# Patient Record
Sex: MALE | Race: WHITE | NOT HISPANIC OR LATINO | Employment: OTHER | URBAN - METROPOLITAN AREA
[De-identification: names, ages, dates, MRNs, and addresses within clinical notes are randomized per-mention and may not be internally consistent; named-entity substitution may affect disease eponyms.]

---

## 2017-01-05 DIAGNOSIS — R55 SYNCOPE AND COLLAPSE: ICD-10-CM
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
## 2017-01-11 ENCOUNTER — TELEPHONE (OUTPATIENT)
Dept: CARDIOLOGY | Facility: MEDICAL CENTER | Age: 82
End: 2017-01-11

## 2017-01-11 LAB — EKG IMPRESSION: NORMAL

## 2017-01-11 NOTE — TELEPHONE ENCOUNTER
Holter monitor from 12/29/2016 read by Dr. Mauro Garza:    Summary: short runs of SVT, likely atrial tachycardia, not dangerous     Electronically Signed On 1- 9:12:08 PST by Mauro Garza MD     Called patient and advised him of test results. Patient is thankful for the call.    RODRIGUEZ NAVARRO

## 2017-01-27 ENCOUNTER — APPOINTMENT (OUTPATIENT)
Dept: RADIOLOGY | Facility: MEDICAL CENTER | Age: 82
End: 2017-01-27
Attending: EMERGENCY MEDICINE
Payer: MEDICARE

## 2017-01-27 ENCOUNTER — HOSPITAL ENCOUNTER (EMERGENCY)
Facility: MEDICAL CENTER | Age: 82
End: 2017-01-27
Attending: EMERGENCY MEDICINE
Payer: MEDICARE

## 2017-01-27 VITALS
HEIGHT: 68 IN | BODY MASS INDEX: 21.05 KG/M2 | OXYGEN SATURATION: 95 % | WEIGHT: 138.89 LBS | TEMPERATURE: 98 F | SYSTOLIC BLOOD PRESSURE: 192 MMHG | DIASTOLIC BLOOD PRESSURE: 89 MMHG | RESPIRATION RATE: 14 BRPM | HEART RATE: 45 BPM

## 2017-01-27 DIAGNOSIS — I10 ESSENTIAL HYPERTENSION: ICD-10-CM

## 2017-01-27 LAB
ALBUMIN SERPL BCP-MCNC: 4.1 G/DL (ref 3.2–4.9)
ALBUMIN/GLOB SERPL: 1.6 G/DL
ALP SERPL-CCNC: 87 U/L (ref 30–99)
ALT SERPL-CCNC: 52 U/L (ref 2–50)
ANION GAP SERPL CALC-SCNC: 8 MMOL/L (ref 0–11.9)
APTT PPP: 32.7 SEC (ref 24.7–36)
AST SERPL-CCNC: 44 U/L (ref 12–45)
BASOPHILS # BLD AUTO: 0.2 % (ref 0–1.8)
BASOPHILS # BLD: 0.01 K/UL (ref 0–0.12)
BILIRUB SERPL-MCNC: 1.4 MG/DL (ref 0.1–1.5)
BNP SERPL-MCNC: 482 PG/ML (ref 0–100)
BUN SERPL-MCNC: 21 MG/DL (ref 8–22)
CALCIUM SERPL-MCNC: 8.7 MG/DL (ref 8.4–10.2)
CHLORIDE SERPL-SCNC: 104 MMOL/L (ref 96–112)
CO2 SERPL-SCNC: 30 MMOL/L (ref 20–33)
CREAT SERPL-MCNC: 1.06 MG/DL (ref 0.5–1.4)
EKG IMPRESSION: NORMAL
EOSINOPHIL # BLD AUTO: 0.05 K/UL (ref 0–0.51)
EOSINOPHIL NFR BLD: 1.1 % (ref 0–6.9)
ERYTHROCYTE [DISTWIDTH] IN BLOOD BY AUTOMATED COUNT: 48.3 FL (ref 35.9–50)
GFR SERPL CREATININE-BSD FRML MDRD: >60 ML/MIN/1.73 M 2
GLOBULIN SER CALC-MCNC: 2.5 G/DL (ref 1.9–3.5)
GLUCOSE SERPL-MCNC: 90 MG/DL (ref 65–99)
HCT VFR BLD AUTO: 37.2 % (ref 42–52)
HGB BLD-MCNC: 12.6 G/DL (ref 14–18)
IMM GRANULOCYTES # BLD AUTO: 0.01 K/UL (ref 0–0.11)
IMM GRANULOCYTES NFR BLD AUTO: 0.2 % (ref 0–0.9)
INR PPP: 1.06 (ref 0.87–1.13)
LIPASE SERPL-CCNC: 28 U/L (ref 7–58)
LYMPHOCYTES # BLD AUTO: 1.67 K/UL (ref 1–4.8)
LYMPHOCYTES NFR BLD: 36.6 % (ref 22–41)
MCH RBC QN AUTO: 31.2 PG (ref 27–33)
MCHC RBC AUTO-ENTMCNC: 33.9 G/DL (ref 33.7–35.3)
MCV RBC AUTO: 92.1 FL (ref 81.4–97.8)
MONOCYTES # BLD AUTO: 0.48 K/UL (ref 0–0.85)
MONOCYTES NFR BLD AUTO: 10.5 % (ref 0–13.4)
NEUTROPHILS # BLD AUTO: 2.34 K/UL (ref 1.82–7.42)
NEUTROPHILS NFR BLD: 51.4 % (ref 44–72)
NRBC # BLD AUTO: 0 K/UL
NRBC BLD AUTO-RTO: 0 /100 WBC
PLATELET # BLD AUTO: 174 K/UL (ref 164–446)
PMV BLD AUTO: 9.8 FL (ref 9–12.9)
POTASSIUM SERPL-SCNC: 3.4 MMOL/L (ref 3.6–5.5)
PROT SERPL-MCNC: 6.6 G/DL (ref 6–8.2)
PROTHROMBIN TIME: 13.7 SEC (ref 12–14.6)
RBC # BLD AUTO: 4.04 M/UL (ref 4.7–6.1)
SODIUM SERPL-SCNC: 142 MMOL/L (ref 135–145)
TROPONIN I SERPL-MCNC: <0.02 NG/ML (ref 0–0.04)
WBC # BLD AUTO: 4.6 K/UL (ref 4.8–10.8)

## 2017-01-27 PROCEDURE — 80053 COMPREHEN METABOLIC PANEL: CPT

## 2017-01-27 PROCEDURE — 83690 ASSAY OF LIPASE: CPT

## 2017-01-27 PROCEDURE — 71010 DX-CHEST-PORTABLE (1 VIEW): CPT

## 2017-01-27 PROCEDURE — 85025 COMPLETE CBC W/AUTO DIFF WBC: CPT

## 2017-01-27 PROCEDURE — 85610 PROTHROMBIN TIME: CPT

## 2017-01-27 PROCEDURE — 99284 EMERGENCY DEPT VISIT MOD MDM: CPT

## 2017-01-27 PROCEDURE — 84484 ASSAY OF TROPONIN QUANT: CPT

## 2017-01-27 PROCEDURE — 83880 ASSAY OF NATRIURETIC PEPTIDE: CPT

## 2017-01-27 PROCEDURE — 93005 ELECTROCARDIOGRAM TRACING: CPT | Performed by: EMERGENCY MEDICINE

## 2017-01-27 PROCEDURE — 36415 COLL VENOUS BLD VENIPUNCTURE: CPT

## 2017-01-27 PROCEDURE — 85730 THROMBOPLASTIN TIME PARTIAL: CPT

## 2017-01-27 PROCEDURE — 700102 HCHG RX REV CODE 250 W/ 637 OVERRIDE(OP): Performed by: EMERGENCY MEDICINE

## 2017-01-27 PROCEDURE — A9270 NON-COVERED ITEM OR SERVICE: HCPCS | Performed by: EMERGENCY MEDICINE

## 2017-01-27 RX ORDER — ENALAPRIL MALEATE 5 MG/1
5 TABLET ORAL ONCE
Status: COMPLETED | OUTPATIENT
Start: 2017-01-27 | End: 2017-01-27

## 2017-01-27 RX ORDER — ENALAPRIL MALEATE 5 MG/1
5 TABLET ORAL DAILY
Qty: 30 TAB | Refills: 0 | Status: SHIPPED | OUTPATIENT
Start: 2017-01-27 | End: 2018-03-17

## 2017-01-27 RX ORDER — CLONIDINE HYDROCHLORIDE 0.1 MG/1
0.1 TABLET ORAL ONCE
Status: COMPLETED | OUTPATIENT
Start: 2017-01-27 | End: 2017-01-27

## 2017-01-27 RX ORDER — HYDRALAZINE HYDROCHLORIDE 20 MG/ML
20 INJECTION INTRAMUSCULAR; INTRAVENOUS ONCE
Status: DISCONTINUED | OUTPATIENT
Start: 2017-01-27 | End: 2017-01-27 | Stop reason: HOSPADM

## 2017-01-27 RX ORDER — DONEPEZIL HYDROCHLORIDE 5 MG/1
5 TABLET, FILM COATED ORAL NIGHTLY
Status: SHIPPED | COMMUNITY
End: 2018-03-17

## 2017-01-27 RX ADMIN — CLONIDINE HYDROCHLORIDE 0.1 MG: 0.1 TABLET ORAL at 16:21

## 2017-01-27 RX ADMIN — ENALAPRIL MALEATE 5 MG: 5 TABLET ORAL at 15:13

## 2017-01-27 ASSESSMENT — PAIN SCALES - GENERAL: PAINLEVEL_OUTOF10: 0

## 2017-01-27 NOTE — ED NOTES
Pt medicated as ordered with Vasotec for HTN. POC was discussed with pt and pt's daughter using AIDET. They verbalized understanding. Pt with call light in reach and tamara in low position for pt safety. PCXR completed.

## 2017-01-27 NOTE — ED AVS SNAPSHOT
After Visit Summary                                                                                                                Frederick Louis Schwab   MRN: [id number]    Department:  [facility], Emergency Dept   Date of Visit:  1/27/2017            [facility], Emergency Dept    [address]    [place] [address]    Phone:  [phone]      You were seen by     Phu Constantino M.D.      Your Diagnosis Was     Essential hypertension     I10       These are the medications you received during your hospitalization from 01/27/2017 1226 to 01/27/2017 1801     Date/Time Order Dose Route Action    01/27/2017 1513 enalapril (VASOTEC) tablet 5 mg 5 mg Oral Given    01/27/2017 1621 clonidine (CATAPRES) tablet 0.1 mg 0.1 mg Oral Given      Follow-up Information     1. Follow up with Dave Mack D.O. In 1 week.    Specialty:  Family Medicine    Contact information    [facility]  [address]  [place] [address]  [phone]        Medication Information     Review all of your home medications and newly ordered medications with your primary doctor and/or pharmacist as soon as possible. Follow medication instructions as directed by your doctor and/or pharmacist.     Please keep your complete medication list with you and share with your physician. Update the information when medications are discontinued, doses are changed, or new medications (including over-the-counter products) are added; and carry medication information at all times in the event of emergency situations.               Medication List      START taking these medications        Instructions    enalapril 5 MG Tabs   Commonly known as:  VASOTEC    Take 1 Tab by mouth every day.   Dose:  5 mg         ASK your doctor about these medications        Instructions    CALCIUM PO    Take 1 Tab by mouth every morning.   Dose:  1 Tab       donepezil 5 MG Tabs   Commonly known as:  ARICEPT    Take 5 mg by mouth every  evening.   Dose:  5 mg       IRON PO    Take 1 Tab by mouth every day.   Dose:  1 Tab       therapeutic multivitamin-minerals Tabs    Take 1 Tab by mouth every morning.   Dose:  1 Tab               Procedures and tests performed during your visit     APTT    BTYPE NATRIURETIC PEPTIDE    CBC WITH DIFFERENTIAL    COMP METABOLIC PANEL    DX-CHEST-PORTABLE (1 VIEW)    EKG (ER)    ESTIMATED GFR    LIPASE    PROTHROMBIN TIME    TROPONIN        Discharge Instructions       Arterial Hypertension  Arterial hypertension (high blood pressure) is a condition of elevated pressure in your blood vessels. Hypertension over a long period of time is a risk factor for strokes, heart attacks, and heart failure. It is also the leading cause of kidney (renal) failure.   CAUSES   · In Adults -- Over 90% of all hypertension has no known cause. This is called essential or primary hypertension. In the other 10% of people with hypertension, the increase in blood pressure is caused by another disorder. This is called secondary hypertension. Important causes of secondary hypertension are:  · Heavy alcohol use.  · Obstructive sleep apnea.  · Hyperaldosterosim (Conn's syndrome).  · Steroid use.  · Chronic kidney failure.  · Hyperparathyroidism.  · Medications.  · Renal artery stenosis.  · Pheochromocytoma.  · Cushing's disease.  · Coarctation of the aorta.  · Scleroderma renal crisis.  · Licorice (in excessive amounts).  · Drugs (cocaine, methamphetamine).  Your caregiver can explain any items above that apply to you.  · In Children -- Secondary hypertension is more common and should always be considered.  · Pregnancy -- Few women of childbearing age have high blood pressure. However, up to 10% of them develop hypertension of pregnancy. Generally, this will not harm the woman. It may be a sign of 3 complications of pregnancy: preeclampsia, HELLP syndrome, and eclampsia. Follow up and control with medication is necessary.  SYMPTOMS   · This  "condition normally does not produce any noticeable symptoms. It is usually found during a routine exam.  · Malignant hypertension is a late problem of high blood pressure. It may have the following symptoms:  · Headaches.  · Blurred vision.  · End-organ damage (this means your kidneys, heart, lungs, and other organs are being damaged).  · Stressful situations can increase the blood pressure. If a person with normal blood pressure has their blood pressure go up while being seen by their caregiver, this is often termed \"white coat hypertension.\" Its importance is not known. It may be related with eventually developing hypertension or complications of hypertension.  · Hypertension is often confused with mental tension, stress, and anxiety.  DIAGNOSIS   The diagnosis is made by 3 separate blood pressure measurements. They are taken at least 1 week apart from each other. If there is organ damage from hypertension, the diagnosis may be made without repeat measurements.  Hypertension is usually identified by having blood pressure readings:  · Above 140/90 mmHg measured in both arms, at 3 separate times, over a couple weeks.  · Over 130/80 mmHg should be considered a risk factor and may require treatment in patients with diabetes.  Blood pressure readings over 120/80 mmHg are called \"pre-hypertension\" even in non-diabetic patients.  To get a true blood pressure measurement, use the following guidelines. Be aware of the factors that can alter blood pressure readings.  · Take measurements at least 1 hour after caffeine.  · Take measurements 30 minutes after smoking and without any stress. This is another reason to quit smoking  it raises your blood pressure.  · Use a proper cuff size. Ask your caregiver if you are not sure about your cuff size.  · Most home blood pressure cuffs are automatic. They will measure systolic and diastolic pressures. The systolic pressure is the pressure reading at the start of sounds. Diastolic " "pressure is the pressure at which the sounds disappear. If you are elderly, measure pressures in multiple postures. Try sitting, lying or standing.  · Sit at rest for a minimum of 5 minutes before taking measurements.  · You should not be on any medications like decongestants. These are found in many cold medications.  · Record your blood pressure readings and review them with your caregiver.  If you have hypertension:  · Your caregiver may do tests to be sure you do not have secondary hypertension (see \"causes\" above).  · Your caregiver may also look for signs of metabolic syndrome. This is also called Syndrome X or Insulin Resistance Syndrome. You may have this syndrome if you have type 2 diabetes, abdominal obesity, and abnormal blood lipids in addition to hypertension.  · Your caregiver will take your medical and family history and perform a physical exam.  · Diagnostic tests may include blood tests (for glucose, cholesterol, potassium, and kidney function), a urinalysis, or an EKG. Other tests may also be necessary depending on your condition.  PREVENTION   There are important lifestyle issues that you can adopt to reduce your chance of developing hypertension:  · Maintain a normal weight.  · Limit the amount of salt (sodium) in your diet.  · Exercise often.  · Limit alcohol intake.  · Get enough potassium in your diet. Discuss specific advice with your caregiver.  · Follow a DASH diet (dietary approaches to stop hypertension). This diet is rich in fruits, vegetables, and low-fat dairy products, and avoids certain fats.  PROGNOSIS   Essential hypertension cannot be cured. Lifestyle changes and medical treatment can lower blood pressure and reduce complications. The prognosis of secondary hypertension depends on the underlying cause. Many people whose hypertension is controlled with medicine or lifestyle changes can live a normal, healthy life.   RISKS AND COMPLICATIONS   While high blood pressure alone is not " "an illness, it often requires treatment due to its short- and long-term effects on many organs. Hypertension increases your risk for:  · CVAs or strokes (cerebrovascular accident).  · Heart failure due to chronically high blood pressure (hypertensive cardiomyopathy).  · Heart attack (myocardial infarction).  · Damage to the retina (hypertensive retinopathy).  · Kidney failure (hypertensive nephropathy).  Your caregiver can explain list items above that apply to you. Treatment of hypertension can significantly reduce the risk of complications.  TREATMENT   · For overweight patients, weight loss and regular exercise are recommended. Physical fitness lowers blood pressure.  · Mild hypertension is usually treated with diet and exercise. A diet rich in fruits and vegetables, fat-free dairy products, and foods low in fat and salt (sodium) can help lower blood pressure. Decreasing salt intake decreases blood pressure in a 1/3 of people.  · Stop smoking if you are a smoker.  The steps above are highly effective in reducing blood pressure. While these actions are easy to suggest, they are difficult to achieve. Most patients with moderate or severe hypertension end up requiring medications to bring their blood pressure down to a normal level. There are several classes of medications for treatment. Blood pressure pills (antihypertensives) will lower blood pressure by their different actions. Lowering the blood pressure by 10 mmHg may decrease the risk of complications by as much as 25%.  The goal of treatment is effective blood pressure control. This will reduce your risk for complications. Your caregiver will help you determine the best treatment for you according to your lifestyle. What is excellent treatment for one person, may not be for you.  HOME CARE INSTRUCTIONS   · Do not smoke.  · Follow the lifestyle changes outlined in the \"Prevention\" section.  · If you are on medications, follow the directions carefully. Blood " "pressure medications must be taken as prescribed. Skipping doses reduces their benefit. It also puts you at risk for problems.  · Follow up with your caregiver, as directed.  · If you are asked to monitor your blood pressure at home, follow the guidelines in the \"Diagnosis\" section above.  SEEK MEDICAL CARE IF:   · You think you are having medication side effects.  · You have recurrent headaches or lightheadedness.  · You have swelling in your ankles.  · You have trouble with your vision.  SEEK IMMEDIATE MEDICAL CARE IF:   · You have sudden onset of chest pain or pressure, difficulty breathing, or other symptoms of a heart attack.  · You have a severe headache.  · You have symptoms of a stroke (such as sudden weakness, difficulty speaking, difficulty walking).  MAKE SURE YOU:   · Understand these instructions.  · Will watch your condition.  · Will get help right away if you are not doing well or get worse.  Document Released: 12/18/2006 Document Revised: 03/11/2013 Document Reviewed: 07/17/2008  ExitCare® Patient Information ©2014 [employer].            Patient Information     Patient Information    Following emergency treatment: all patient requiring follow-up care must return either to a private physician or a clinic if your condition worsens before you are able to obtain further medical attention, please return to the emergency room.     Billing Information    At [facility], we work to make the billing process streamlined for our patients.  Our Representatives are here to answer any questions you may have regarding your hospital bill.  If you have insurance coverage and have supplied your insurance information to us, we will submit a claim to your insurer on your behalf.  Should you have any questions regarding your bill, we can be reached online or by phone as follows:  Online: You are able pay your bills online or live chat with our representatives about any billing questions you may have. We are here to " help Monday - Friday from 8:00am to 7:30pm and 9:00am - 12:00pm on Saturdays.  Please visit [email or web address]  for more information.   Phone:  [phone] or [phone]    Please note that your emergency physician, surgeon, pathologist, radiologist, anesthesiologist, and other specialists are not employed by [facility] and will therefore bill separately for their services.  Please contact them directly for any questions concerning their bills at the numbers below:     Emergency Physician Services:  [phone]  [facility]:  [phone]  [facility]:  [phone]  [facility]:  [phone]    1. Your final bill may vary from the amount quoted upon discharge if all procedures are not complete at that time, or if your doctor has additional procedures of which we are not aware. You will receive an additional bill if you return to the Emergency Department at [facility] for suture removal regardless of the facility of which the sutures were placed.     2. Please arrange for settlement of this account at the emergency registration.    3. All self-pay accounts are due in full at the time of treatment.  If you are unable to meet this obligation then payment is expected within 4-5 days.     4. If you have had radiology studies (CT, X-ray, Ultrasound, MRI), you have received a preliminary result during your emergency department visit. Please contact the radiology department [phone] to receive a copy of your final result. Please discuss the Final result with your primary physician or with the follow up physician provided.     Crisis Hotline:  [place] Crisis Hotline:  6-295-JOUEUYU or 1-661.128.5550  [place] Crisis Hotline:    1-754.643.3769 or 053-165-7081         ED Discharge Follow Up Questions    1. In order to provide you with very good care, we would like to follow up with a phone call in the next few days.  May we have  your permission to contact you?     YES /  NO    2. What is the best phone number to call you? (       )_____-__________    3. What is the best time to call you?      Morning  /  Afternoon  /  Evening                   Patient Signature:  ____________________________________________________________    Date:  ____________________________________________________________      Your appointments     Mar 15, 2017  1:45 PM   FOLLOW UP with Mauro Garza M.D.   [facility] (--)    [address] [place] [address]   [phone]

## 2017-01-27 NOTE — ED AVS SNAPSHOT
Backyard Access Code: [email or web address]  Expires: 2/26/2017  2:15 PM    Backyard  A secure, online tool to manage your health information     [employer]’s Backyard® is a secure, online tool that connects you to your personalized health information from the privacy of your home -- day or night - making it very easy for you to manage your healthcare. Once the activation process is completed, you can even access your medical information using the Backyard dawit, which is available for free in the Apple Dawit store or Google Play store.     Backyard provides the following levels of access (as shown below):   My Chart Features   [facility] Primary Care Doctor [facility]  Specialists [facility]  Urgent  Care Non-[facility]  Primary Care  Doctor   Email your healthcare team securely and privately 24/7 X X X X   Manage appointments: schedule your next appointment; view details of past/upcoming appointments X      Request prescription refills. X      View recent personal medical records, including lab and immunizations X X X X   View health record, including health history, allergies, medications X X X X   Read reports about your outpatient visits, procedures, consult and ER notes X X X X   See your discharge summary, which is a recap of your hospital and/or ER visit that includes your diagnosis, lab results, and care plan. X X       How to register for Backyard:  1. Go to  [email or web address].  2. Click on the Sign Up Now box, which takes you to the New Member Sign Up page. You will need to provide the following information:  a. Enter your Backyard Access Code exactly as it appears at the top of this page. (You will not need to use this code after you’ve completed the sign-up process. If you do not sign up before the expiration date, you must request a new code.)   b. Enter your date of birth.   c. Enter your home email address.   d. Click Submit, and follow the next screen’s instructions.  3. Create a Backyard ID. This will be your [employer]  login ID and cannot be changed, so think of one that is secure and easy to remember.  4. Create a [employer] password. You can change your password at any time.  5. Enter your Password Reset Question and Answer. This can be used at a later time if you forget your password.   6. Enter your e-mail address. This allows you to receive e-mail notifications when new information is available in [employer].  7. Click Sign Up. You can now view your health information.    For assistance activating your [employer] account, call [phone]

## 2017-01-27 NOTE — ED AVS SNAPSHOT
1/27/2017          Frederick Louis Schwab  [address] [place] [address]    Dear Neftali:    [facility] wants to ensure your discharge home is safe and you or your loved ones have had all your questions answered regarding your care after you leave the hospital.    You may receive a telephone call within two days of your discharge.  This call is to make certain you understand your discharge instructions as well as ensure we provided you with the best care possible during your stay with us.     The call will only last approximately 3-5 minutes and will be done by a nurse.    Once again, we want to ensure your discharge home is safe and that you have a clear understanding of any next steps in your care.  If you have any questions or concerns, please do not hesitate to contact us, we are here for you.  Thank you for choosing [facility] for your healthcare needs.    Sincerely,    Tyler Watson    [facility]

## 2017-01-27 NOTE — ED NOTES
The Medication Reconciliation has been completed per patient  Allergies have been reviewNoneed  Antibiotic use in 30 days - NONE    [facility]

## 2017-01-28 ENCOUNTER — HOSPITAL ENCOUNTER (EMERGENCY)
Facility: MEDICAL CENTER | Age: 82
End: 2017-01-28
Attending: EMERGENCY MEDICINE
Payer: MEDICARE

## 2017-01-28 VITALS
WEIGHT: 139.99 LBS | RESPIRATION RATE: 12 BRPM | SYSTOLIC BLOOD PRESSURE: 169 MMHG | HEART RATE: 47 BPM | HEIGHT: 68 IN | BODY MASS INDEX: 21.22 KG/M2 | DIASTOLIC BLOOD PRESSURE: 89 MMHG | TEMPERATURE: 98.5 F | OXYGEN SATURATION: 95 %

## 2017-01-28 DIAGNOSIS — I10 ESSENTIAL HYPERTENSION: ICD-10-CM

## 2017-01-28 DIAGNOSIS — R00.1 BRADYCARDIA: ICD-10-CM

## 2017-01-28 LAB — EKG IMPRESSION: NORMAL

## 2017-01-28 PROCEDURE — 93005 ELECTROCARDIOGRAM TRACING: CPT | Performed by: EMERGENCY MEDICINE

## 2017-01-28 PROCEDURE — 99283 EMERGENCY DEPT VISIT LOW MDM: CPT

## 2017-01-28 ASSESSMENT — PAIN SCALES - GENERAL: PAINLEVEL_OUTOF10: 0

## 2017-01-28 NOTE — ED NOTES
Pt D/C to home. IV removed. D/C instructions and prescriptions given to patient. Pt verbalizes understanding. Pt leaves ED with no acute changes, complaints or concerns.

## 2017-01-28 NOTE — ED AVS SNAPSHOT
After Visit Summary                                                                                                                Frederick Louis Schwab   MRN: [id number]    Department:  [facility], Emergency Dept   Date of Visit:  1/28/2017            [facility], Emergency Dept    [address]    [place] [address]    Phone:  [phone]      You were seen by     Jono Cervantes M.D.      Your Diagnosis Was     Essential hypertension     I10       Medication Information     Review all of your home medications and newly ordered medications with your primary doctor and/or pharmacist as soon as possible. Follow medication instructions as directed by your doctor and/or pharmacist.     Please keep your complete medication list with you and share with your physician. Update the information when medications are discontinued, doses are changed, or new medications (including over-the-counter products) are added; and carry medication information at all times in the event of emergency situations.               Medication List      ASK your doctor about these medications        Instructions    CALCIUM PO    Take 1 Tab by mouth every morning.   Dose:  1 Tab       donepezil 5 MG Tabs   Commonly known as:  ARICEPT    Take 5 mg by mouth every evening.   Dose:  5 mg       enalapril 5 MG Tabs   Commonly known as:  VASOTEC    Take 1 Tab by mouth every day.   Dose:  5 mg       IRON PO    Take 1 Tab by mouth every day.   Dose:  1 Tab       therapeutic multivitamin-minerals Tabs    Take 1 Tab by mouth every morning.   Dose:  1 Tab               Procedures and tests performed during your visit     EKG (ER)    NURSING COMMUNICATION        Discharge Instructions       Bradycardia  You have a slow heart rate. This is called bradycardia. At rest, the normal heart rate is between  beats per minute. A slow heart may cause weakness, dizziness, loss of consciousness, and shortness of  breath. This gets worse when you are active.   The medical causes of bradycardia can include:  · Heart and thyroid problems.   · High potassium.   · Side effects of some medicines.   Well-trained athletes may have heart rates as slow as 42 beats per minute. Evaluation of bradycardia may require an electrocardiogram (ECG), blood tests, and possibly other heart studies.   Bradycardia due to heart disease can be a serious problem. Damage to the heart's electrical system may require a temporary or permanent pacemaker. Beta-blocker drugs, digoxin, and other medicines used to control blood pressure and heart rhythms will also slow the heart. These medicines may need to be used in lower doses, or be stopped, if you have problems from the bradycardia.   SEEK IMMEDIATE MEDICAL CARE IF:   · You develop fainting, extreme weakness, shortness of breath, or fever.   · You develop severe chest or abdominal pain, repeated vomiting, or dehydration.   · You become sweaty and weak.   MAKE SURE YOU:   · Understand these instructions.   · Will watch your condition.   · Will get help right away if you are not doing well or get worse.   Document Released: 12/18/2006 Document Revised: 03/11/2013 Document Reviewed: 03/17/2010  Best[employer]Websites® Patient Information ©2013 [employer].  Hypertension  Hypertension, commonly called high blood pressure, is when the force of blood pumping through your arteries is too strong. Your arteries are the blood vessels that carry blood from your heart throughout your body. A blood pressure reading consists of a higher number over a lower number, such as 110/72. The higher number (systolic) is the pressure inside your arteries when your heart pumps. The lower number (diastolic) is the pressure inside your arteries when your heart relaxes. Ideally you want your blood pressure below 120/80.  Hypertension forces your heart to work harder to pump blood. Your arteries may become narrow or stiff. Having untreated or  uncontrolled hypertension can cause heart attack, stroke, kidney disease, and other problems.  RISK FACTORS  Some risk factors for high blood pressure are controllable. Others are not.   Risk factors you cannot control include:   · Race. You may be at higher risk if you are .  · Age. Risk increases with age.  · Gender. Men are at higher risk than women before age 45 years. After age 65, women are at higher risk than men.  Risk factors you can control include:  · Not getting enough exercise or physical activity.  · Being overweight.  · Getting too much fat, sugar, calories, or salt in your diet.  · Drinking too much alcohol.  SIGNS AND SYMPTOMS  Hypertension does not usually cause signs or symptoms. Extremely high blood pressure (hypertensive crisis) may cause headache, anxiety, shortness of breath, and nosebleed.  DIAGNOSIS  To check if you have hypertension, your health care provider will measure your blood pressure while you are seated, with your arm held at the level of your heart. It should be measured at least twice using the same arm. Certain conditions can cause a difference in blood pressure between your right and left arms. A blood pressure reading that is higher than normal on one occasion does not mean that you need treatment. If it is not clear whether you have high blood pressure, you may be asked to return on a different day to have your blood pressure checked again. Or, you may be asked to monitor your blood pressure at home for 1 or more weeks.  TREATMENT  Treating high blood pressure includes making lifestyle changes and possibly taking medicine. Living a healthy lifestyle can help lower high blood pressure. You may need to change some of your habits.  Lifestyle changes may include:  · Following the DASH diet. This diet is high in fruits, vegetables, and whole grains. It is low in salt, red meat, and added sugars.  · Keep your sodium intake below 2,300 mg per day.  · Getting at least  30-45 minutes of aerobic exercise at least 4 times per week.  · Losing weight if necessary.  · Not smoking.  · Limiting alcoholic beverages.  · Learning ways to reduce stress.  Your health care provider may prescribe medicine if lifestyle changes are not enough to get your blood pressure under control, and if one of the following is true:  · You are 18-59 years of age and your systolic blood pressure is above 140.  · You are 60 years of age or older, and your systolic blood pressure is above 150.  · Your diastolic blood pressure is above 90.  · You have diabetes, and your systolic blood pressure is over 140 or your diastolic blood pressure is over 90.  · You have kidney disease and your blood pressure is above 140/90.  · You have heart disease and your blood pressure is above 140/90.  Your personal target blood pressure may vary depending on your medical conditions, your age, and other factors.  HOME CARE INSTRUCTIONS  · Have your blood pressure rechecked as directed by your health care provider.    · Take medicines only as directed by your health care provider. Follow the directions carefully. Blood pressure medicines must be taken as prescribed. The medicine does not work as well when you skip doses. Skipping doses also puts you at risk for problems.  · Do not smoke.    · Monitor your blood pressure at home as directed by your health care provider.   SEEK MEDICAL CARE IF:   · You think you are having a reaction to medicines taken.  · You have recurrent headaches or feel dizzy.  · You have swelling in your ankles.  · You have trouble with your vision.  SEEK IMMEDIATE MEDICAL CARE IF:  · You develop a severe headache or confusion.  · You have unusual weakness, numbness, or feel faint.  · You have severe chest or abdominal pain.  · You vomit repeatedly.  · You have trouble breathing.  MAKE SURE YOU:   · Understand these instructions.  · Will watch your condition.  · Will get help right away if you are not doing well  or get worse.     This information is not intended to replace advice given to you by your health care provider. Make sure you discuss any questions you have with your health care provider.     Document Released: 12/18/2006 Document Revised: 05/03/2016 Document Reviewed: 10/10/2014  Elsevier Interactive Patient Education ©2016 [employer]            Patient Information     Patient Information    Following emergency treatment: all patient requiring follow-up care must return either to a private physician or a clinic if your condition worsens before you are able to obtain further medical attention, please return to the emergency room.     Billing Information    At [facility], we work to make the billing process streamlined for our patients.  Our Representatives are here to answer any questions you may have regarding your hospital bill.  If you have insurance coverage and have supplied your insurance information to us, we will submit a claim to your insurer on your behalf.  Should you have any questions regarding your bill, we can be reached online or by phone as follows:  Online: You are able pay your bills online or live chat with our representatives about any billing questions you may have. We are here to help Monday - Friday from 8:00am to 7:30pm and 9:00am - 12:00pm on Saturdays.  Please visit [email or web address]  for more information.   Phone:  [phone] or [phone]    Please note that your emergency physician, surgeon, pathologist, radiologist, anesthesiologist, and other specialists are not employed by [facility] and will therefore bill separately for their services.  Please contact them directly for any questions concerning their bills at the numbers below:     Emergency Physician Services:  [phone]  [facility]:  [phone]  [facility]:  [phone]  [facility]:  [phone]    1. Your final bill may vary  from the amount quoted upon discharge if all procedures are not complete at that time, or if your doctor has additional procedures of which we are not aware. You will receive an additional bill if you return to the Emergency Department at [facility] for suture removal regardless of the facility of which the sutures were placed.     2. Please arrange for settlement of this account at the emergency registration.    3. All self-pay accounts are due in full at the time of treatment.  If you are unable to meet this obligation then payment is expected within 4-5 days.     4. If you have had radiology studies (CT, X-ray, Ultrasound, MRI), you have received a preliminary result during your emergency department visit. Please contact the radiology department [phone] to receive a copy of your final result. Please discuss the Final result with your primary physician or with the follow up physician provided.     Crisis Hotline:  [place] Crisis Hotline:  1-068-QPPQZQQ or 1-694.525.2257  [place] Crisis Hotline:    1-571.520.1658 or 666-683-2233         ED Discharge Follow Up Questions    1. In order to provide you with very good care, we would like to follow up with a phone call in the next few days.  May we have your permission to contact you?     YES /  NO    2. What is the best phone number to call you? (       )_____-__________    3. What is the best time to call you?      Morning  /  Afternoon  /  Evening                   Patient Signature:  ____________________________________________________________    Date:  ____________________________________________________________      Your appointments     Mar 15, 2017  1:45 PM   FOLLOW UP with Mauro Garza M.D.   [facility] (--)    [address]  [facility] [id number] [phone]

## 2017-01-28 NOTE — ED AVS SNAPSHOT
1/28/2017          Frederick Louis Schwab  [address] [place] [address]    Dear Neftali:    [facility] wants to ensure your discharge home is safe and you or your loved ones have had all your questions answered regarding your care after you leave the hospital.    You may receive a telephone call within two days of your discharge.  This call is to make certain you understand your discharge instructions as well as ensure we provided you with the best care possible during your stay with us.     The call will only last approximately 3-5 minutes and will be done by a nurse.    Once again, we want to ensure your discharge home is safe and that you have a clear understanding of any next steps in your care.  If you have any questions or concerns, please do not hesitate to contact us, we are here for you.  Thank you for choosing [facility] for your healthcare needs.    Sincerely,    Tyler Watson    [facility]

## 2017-01-28 NOTE — DISCHARGE INSTRUCTIONS
Arterial Hypertension  Arterial hypertension (high blood pressure) is a condition of elevated pressure in your blood vessels. Hypertension over a long period of time is a risk factor for strokes, heart attacks, and heart failure. It is also the leading cause of kidney (renal) failure.   CAUSES   · In Adults -- Over 90% of all hypertension has no known cause. This is called essential or primary hypertension. In the other 10% of people with hypertension, the increase in blood pressure is caused by another disorder. This is called secondary hypertension. Important causes of secondary hypertension are:  · Heavy alcohol use.  · Obstructive sleep apnea.  · Hyperaldosterosim (Conn's syndrome).  · Steroid use.  · Chronic kidney failure.  · Hyperparathyroidism.  · Medications.  · Renal artery stenosis.  · Pheochromocytoma.  · Cushing's disease.  · Coarctation of the aorta.  · Scleroderma renal crisis.  · Licorice (in excessive amounts).  · Drugs (cocaine, methamphetamine).  Your caregiver can explain any items above that apply to you.  · In Children -- Secondary hypertension is more common and should always be considered.  · Pregnancy -- Few women of childbearing age have high blood pressure. However, up to 10% of them develop hypertension of pregnancy. Generally, this will not harm the woman. It may be a sign of 3 complications of pregnancy: preeclampsia, HELLP syndrome, and eclampsia. Follow up and control with medication is necessary.  SYMPTOMS   · This condition normally does not produce any noticeable symptoms. It is usually found during a routine exam.  · Malignant hypertension is a late problem of high blood pressure. It may have the following symptoms:  · Headaches.  · Blurred vision.  · End-organ damage (this means your kidneys, heart, lungs, and other organs are being damaged).  · Stressful situations can increase the blood pressure. If a person with normal blood pressure has their blood pressure go up while being  "seen by their caregiver, this is often termed \"white coat hypertension.\" Its importance is not known. It may be related with eventually developing hypertension or complications of hypertension.  · Hypertension is often confused with mental tension, stress, and anxiety.  DIAGNOSIS   The diagnosis is made by 3 separate blood pressure measurements. They are taken at least 1 week apart from each other. If there is organ damage from hypertension, the diagnosis may be made without repeat measurements.  Hypertension is usually identified by having blood pressure readings:  · Above 140/90 mmHg measured in both arms, at 3 separate times, over a couple weeks.  · Over 130/80 mmHg should be considered a risk factor and may require treatment in patients with diabetes.  Blood pressure readings over 120/80 mmHg are called \"pre-hypertension\" even in non-diabetic patients.  To get a true blood pressure measurement, use the following guidelines. Be aware of the factors that can alter blood pressure readings.  · Take measurements at least 1 hour after caffeine.  · Take measurements 30 minutes after smoking and without any stress. This is another reason to quit smoking  it raises your blood pressure.  · Use a proper cuff size. Ask your caregiver if you are not sure about your cuff size.  · Most home blood pressure cuffs are automatic. They will measure systolic and diastolic pressures. The systolic pressure is the pressure reading at the start of sounds. Diastolic pressure is the pressure at which the sounds disappear. If you are elderly, measure pressures in multiple postures. Try sitting, lying or standing.  · Sit at rest for a minimum of 5 minutes before taking measurements.  · You should not be on any medications like decongestants. These are found in many cold medications.  · Record your blood pressure readings and review them with your caregiver.  If you have hypertension:  · Your caregiver may do tests to be sure you do not have " "secondary hypertension (see \"causes\" above).  · Your caregiver may also look for signs of metabolic syndrome. This is also called Syndrome X or Insulin Resistance Syndrome. You may have this syndrome if you have type 2 diabetes, abdominal obesity, and abnormal blood lipids in addition to hypertension.  · Your caregiver will take your medical and family history and perform a physical exam.  · Diagnostic tests may include blood tests (for glucose, cholesterol, potassium, and kidney function), a urinalysis, or an EKG. Other tests may also be necessary depending on your condition.  PREVENTION   There are important lifestyle issues that you can adopt to reduce your chance of developing hypertension:  · Maintain a normal weight.  · Limit the amount of salt (sodium) in your diet.  · Exercise often.  · Limit alcohol intake.  · Get enough potassium in your diet. Discuss specific advice with your caregiver.  · Follow a DASH diet (dietary approaches to stop hypertension). This diet is rich in fruits, vegetables, and low-fat dairy products, and avoids certain fats.  PROGNOSIS   Essential hypertension cannot be cured. Lifestyle changes and medical treatment can lower blood pressure and reduce complications. The prognosis of secondary hypertension depends on the underlying cause. Many people whose hypertension is controlled with medicine or lifestyle changes can live a normal, healthy life.   RISKS AND COMPLICATIONS   While high blood pressure alone is not an illness, it often requires treatment due to its short- and long-term effects on many organs. Hypertension increases your risk for:  · CVAs or strokes (cerebrovascular accident).  · Heart failure due to chronically high blood pressure (hypertensive cardiomyopathy).  · Heart attack (myocardial infarction).  · Damage to the retina (hypertensive retinopathy).  · Kidney failure (hypertensive nephropathy).  Your caregiver can explain list items above that apply to you. Treatment " "of hypertension can significantly reduce the risk of complications.  TREATMENT   · For overweight patients, weight loss and regular exercise are recommended. Physical fitness lowers blood pressure.  · Mild hypertension is usually treated with diet and exercise. A diet rich in fruits and vegetables, fat-free dairy products, and foods low in fat and salt (sodium) can help lower blood pressure. Decreasing salt intake decreases blood pressure in a 1/3 of people.  · Stop smoking if you are a smoker.  The steps above are highly effective in reducing blood pressure. While these actions are easy to suggest, they are difficult to achieve. Most patients with moderate or severe hypertension end up requiring medications to bring their blood pressure down to a normal level. There are several classes of medications for treatment. Blood pressure pills (antihypertensives) will lower blood pressure by their different actions. Lowering the blood pressure by 10 mmHg may decrease the risk of complications by as much as 25%.  The goal of treatment is effective blood pressure control. This will reduce your risk for complications. Your caregiver will help you determine the best treatment for you according to your lifestyle. What is excellent treatment for one person, may not be for you.  HOME CARE INSTRUCTIONS   · Do not smoke.  · Follow the lifestyle changes outlined in the \"Prevention\" section.  · If you are on medications, follow the directions carefully. Blood pressure medications must be taken as prescribed. Skipping doses reduces their benefit. It also puts you at risk for problems.  · Follow up with your caregiver, as directed.  · If you are asked to monitor your blood pressure at home, follow the guidelines in the \"Diagnosis\" section above.  SEEK MEDICAL CARE IF:   · You think you are having medication side effects.  · You have recurrent headaches or lightheadedness.  · You have swelling in your ankles.  · You have trouble with " your vision.  SEEK IMMEDIATE MEDICAL CARE IF:   · You have sudden onset of chest pain or pressure, difficulty breathing, or other symptoms of a heart attack.  · You have a severe headache.  · You have symptoms of a stroke (such as sudden weakness, difficulty speaking, difficulty walking).  MAKE SURE YOU:   · Understand these instructions.  · Will watch your condition.  · Will get help right away if you are not doing well or get worse.  Document Released: 12/18/2006 Document Revised: 03/11/2013 Document Reviewed: 07/17/2008  [employer]® Patient Information ©2014 [employer].

## 2017-01-28 NOTE — ED AVS SNAPSHOT
OZ SafeRooms Access Code: [email or web address]  Expires: 2/26/2017  2:15 PM    OZ SafeRooms  A secure, online tool to manage your health information     [employer]’s OZ SafeRooms® is a secure, online tool that connects you to your personalized health information from the privacy of your home -- day or night - making it very easy for you to manage your healthcare. Once the activation process is completed, you can even access your medical information using the OZ SafeRooms dawit, which is available for free in the Apple Dawit store or Google Play store.     OZ SafeRooms provides the following levels of access (as shown below):   My Chart Features   [facility] Primary Care Doctor [facility]  Specialists [facility]  Urgent  Care Non-[facility]  Primary Care  Doctor   Email your healthcare team securely and privately 24/7 X X X X   Manage appointments: schedule your next appointment; view details of past/upcoming appointments X      Request prescription refills. X      View recent personal medical records, including lab and immunizations X X X X   View health record, including health history, allergies, medications X X X X   Read reports about your outpatient visits, procedures, consult and ER notes X X X X   See your discharge summary, which is a recap of your hospital and/or ER visit that includes your diagnosis, lab results, and care plan. X X       How to register for OZ SafeRooms:  1. Go to  [email or web address].  2. Click on the Sign Up Now box, which takes you to the New Member Sign Up page. You will need to provide the following information:  a. Enter your OZ SafeRooms Access Code exactly as it appears at the top of this page. (You will not need to use this code after you’ve completed the sign-up process. If you do not sign up before the expiration date, you must request a new code.)   b. Enter your date of birth.   c. Enter your home email address.   d. Click Submit, and follow the next screen’s instructions.  3. Create a OZ SafeRooms ID. This will be your [employer]  login ID and cannot be changed, so think of one that is secure and easy to remember.  4. Create a GoAlbert password. You can change your password at any time.  5. Enter your Password Reset Question and Answer. This can be used at a later time if you forget your password.   6. Enter your e-mail address. This allows you to receive e-mail notifications when new information is available in GoAlbert.  7. Click Sign Up. You can now view your health information.    For assistance activating your GoAlbert account, call [phone]

## 2017-01-28 NOTE — ED PROVIDER NOTES
ED Provider Note    CHIEF COMPLAINT  Chief Complaint   Patient presents with   • Hypertension       HPI  Frederick Louis Schwab is a 87 y.o. male who presents with high blood pressure for the past 3 days. Patient has been taking his blood pressure at home. Sometimes twice daily. He denies chest pain or headache. He denies abdominal pain. Patient was concerned with the blood pressure was 190 systolic today.    REVIEW OF SYSTEMS  See HPI for further details. No cough or cold symptoms. No vomiting or fever. All other systems are negative.    PAST MEDICAL HISTORY  Past Medical History   Diagnosis Date   • Cancer (CMS-HCC)      skin cancer        FAMILY HISTORY  Family History   Problem Relation Age of Onset   • Stroke Father 67       SOCIAL HISTORY  Social History     Social History   • Marital Status: Single     Spouse Name: N/A   • Number of Children: N/A   • Years of Education: N/A     Social History Main Topics   • Smoking status: Never Smoker    • Smokeless tobacco: Never Used   • Alcohol Use: No   • Drug Use: No   • Sexual Activity: Not Asked     Other Topics Concern   • None     Social History Narrative       SURGICAL HISTORY  Past Surgical History   Procedure Laterality Date   • Hip nailing intramedullary Left 10/7/2015     Procedure: HIP NAILING INTRAMEDULLARY- Intertan ;  Surgeon: Sloan Sin M.D.;  Location: SURGERY [facility];  Service:    • Other orthopedic surgery       left hip replacement       CURRENT MEDICATIONS  Home Medications     Reviewed by Celeste Enrique (Pharmacy Tech) on 01/27/17 at 1425  Med List Status: Complete    Medication Last Dose Status    CALCIUM PO 1/26/2017 Active    donepezil (ARICEPT) 5 MG Tab 1/25/2017 Active    IRON PO 1/26/2017 Active    therapeutic multivitamin-minerals (THERAGRAN-M) Tab 1/26/2017 Active                ALLERGIES  No Known Allergies    PHYSICAL EXAM  VITAL SIGNS: /89 mmHg  Pulse 48  Temp(Src) 36.7 °C (98 °F)  Resp 17  Ht 1.727 m (5'  "8\")  Wt 63 kg (138 lb 14.2 oz)  BMI 21.12 kg/m2  SpO2 96%  Constitutional: Well developed, Well nourished, No acute distress, Non-toxic appearance. Mildly confused  HENT: Normocephalic, Atraumatic, Bilateral external ears normal, Oropharynx moist, No oral exudates, Nose normal.   Eyes: TOM, EOMI, Conjunctiva normal, No discharge.   Neck: Normal range of motion, No tenderness, Supple, No stridor. No nuchal rigidity  Lymphatic: No lymphadenopathy noted.   Cardiovascular: Regular rate and rhythm  Thorax & Lungs: Clear without wheezin  Abdomen: Bowel sounds normal, Soft, No tenderness, No masses, No pulsatile masses.   Skin: Warm, Dry, No erythema, No rash.   Back: No tenderness, No CVA tenderness.   Extremities: No edema, No tenderness, No cyanosis, No clubbing. Dorsalis pedis pulses 2+ equal bilaterally. Radial pulses 2+ equal bilaterally.  Neurologic: Alert & oriented x 2, Normal motor function, Normal sensory function, No focal deficits noted.     EKG  Normal sinus rhythm at a rate of 50. Normal P waves. Normal QRS. Early R-wave progression. Normal ST segments. Normal T waves. Borderline intraventricular conduction delay. Borderline LVH. No acute change from previous    RADIOLOGY/PROCEDURES  DX-CHEST-PORTABLE (1 VIEW)   Final Result      Stable aortic ectasia with some hilar prominence that also most likely is vascular favored over adenopathy related        Results for orders placed or performed during the hospital encounter of 01/27/17   CBC WITH DIFFERENTIAL   Result Value Ref Range    WBC 4.6 (L) 4.8 - 10.8 K/uL    RBC 4.04 (L) 4.70 - 6.10 M/uL    Hemoglobin 12.6 (L) 14.0 - 18.0 g/dL    Hematocrit 37.2 (L) 42.0 - 52.0 %    MCV 92.1 81.4 - 97.8 fL    MCH 31.2 27.0 - 33.0 pg    MCHC 33.9 33.7 - 35.3 g/dL    RDW 48.3 35.9 - 50.0 fL    Platelet Count 174 164 - 446 K/uL    MPV 9.8 9.0 - 12.9 fL    Neutrophils-Polys 51.40 44.00 - 72.00 %    Lymphocytes 36.60 22.00 - 41.00 %    Monocytes 10.50 0.00 - 13.40 %    " Eosinophils 1.10 0.00 - 6.90 %    Basophils 0.20 0.00 - 1.80 %    Immature Granulocytes 0.20 0.00 - 0.90 %    Nucleated RBC 0.00 /100 WBC    Neutrophils (Absolute) 2.34 1.82 - 7.42 K/uL    Lymphs (Absolute) 1.67 1.00 - 4.80 K/uL    Monos (Absolute) 0.48 0.00 - 0.85 K/uL    Eos (Absolute) 0.05 0.00 - 0.51 K/uL    Baso (Absolute) 0.01 0.00 - 0.12 K/uL    Immature Granulocytes (abs) 0.01 0.00 - 0.11 K/uL    NRBC (Absolute) 0.00 K/uL   COMP METABOLIC PANEL   Result Value Ref Range    Sodium 142 135 - 145 mmol/L    Potassium 3.4 (L) 3.6 - 5.5 mmol/L    Chloride 104 96 - 112 mmol/L    Co2 30 20 - 33 mmol/L    Anion Gap 8.0 0.0 - 11.9    Glucose 90 65 - 99 mg/dL    Bun 21 8 - 22 mg/dL    Creatinine 1.06 0.50 - 1.40 mg/dL    Calcium 8.7 8.4 - 10.2 mg/dL    AST(SGOT) 44 12 - 45 U/L    ALT(SGPT) 52 (H) 2 - 50 U/L    Alkaline Phosphatase 87 30 - 99 U/L    Total Bilirubin 1.4 0.1 - 1.5 mg/dL    Albumin 4.1 3.2 - 4.9 g/dL    Total Protein 6.6 6.0 - 8.2 g/dL    Globulin 2.5 1.9 - 3.5 g/dL    A-G Ratio 1.6 g/dL   LIPASE   Result Value Ref Range    Lipase 28 7 - 58 U/L   PROTHROMBIN TIME   Result Value Ref Range    PT 13.7 12.0 - 14.6 sec    INR 1.06 0.87 - 1.13   APTT   Result Value Ref Range    APTT 32.7 24.7 - 36.0 sec   TROPONIN   Result Value Ref Range    Troponin I <0.02 0.00 - 0.04 ng/mL   BTYPE NATRIURETIC PEPTIDE   Result Value Ref Range    B Natriuretic Peptide 482 (H) 0 - 100 pg/mL   ESTIMATED GFR   Result Value Ref Range    GFR If African American >60 >60 mL/min/1.73 m 2    GFR If Non African American >60 >60 mL/min/1.73 m 2   EKG (ER)   Result Value Ref Range    Report       [facility] Emergency Dept.    Test Date:  2017  Pt Name:    FREDERICK SCHWAB             Department: [facility]  MRN:        [id number]                      [facility]  Gender:     M                            Technician: ms  :        [id number]                   Requested By:DOMINIC MEJIAS  Order #:    [id number]                     Reading MD:    Measurements  Intervals                                Axis  Rate:       52                           P:          47  TN:         161                          QRS:        -43  QRSD:       122                          T:          -20  QT:         539  QTc:        502    Interpretive Statements  Sinus rhythm  Nonspecific IVCD with LAD  Left ventricular hypertrophy  Borderline T abnormalities, inferior leads  Compared to ECG 11/04/2016 18:51:20  Intraventricular conduction delay now present  Left ventricular hypertrophy now present  Sinus bradycardia no longer present  Left-axis deviation no longer present  T-w ave abnormality still present           COURSE & MEDICAL DECISION MAKING  Pertinent Labs & Imaging studies reviewed. (See chart for details)  Patient is otherwise pretty well-appearing. ENT was slightly elevated at 400. He does have a history of diastolic dysfunction on his previous echo. I suspect this can be followed up. He is asymptomatic as far as his hypertension. We will start him on enalapril given his diastolic dysfunction. He is to hold his antihypertensives if his blood pressure is lower than 150/100. Patient was discharged home in stable condition      FINAL IMPRESSION  1. Hypertension  2.   3.      Please note that this dictation was created using voice recognition software. I have worked with consultants from the vendor as well as technical experts from [facility] to optimize the interface. I have made every reasonable attempt to correct obvious errors, but I expect that there are errors of grammar and possibly content that I did not discover before finalizing the note.      Electronically signed by: Phu Constantino, 1/27/2017 5:47 PM

## 2017-01-29 NOTE — ED PROVIDER NOTES
ED Provider Note    CHIEF COMPLAINT  Chief Complaint   Patient presents with   • Blood Pressure Problem       HPI  Frederick Louis Schwab is a 87 y.o. male who presents with reevaluation for elevated blood pressure. Patient was seen in the emergency department yesterday for similar complaint. He has been asymptomatic but clearly concerned about the elevated numbers. He has not had prior treatment for hypertension. He notes that he has actually had ongoing care instructions for persistent aggressive oral hydration due to previous low blood pressures in what sounds to be orthostatic dizziness by history. Again he has been asymptomatic over this last week but has noticed elevated blood pressures on home monitoring. He did have evaluation the ER yesterday which did not show any acute abnormalities by his review and also on chart review on my part today. Today with recurrence monitoring and with the one dose of 5 mg enalapril at home this morning he noticed elevated blood pressure this evening bring him back to the ER at this time. He has yet to make a follow-up appointment his primary care physician is only been 24 hours. Specifically denies any headache, vision changes, unilateral symptoms, chest pain or palpitations, shortness of breath. No urinary changes. No recent illness.    REVIEW OF SYSTEMS  See HPI for further details. All other systems are negative.     PAST MEDICAL HISTORY   has a past medical history of Cancer (CMS-HCC).    SOCIAL HISTORY  Social History     Social History Main Topics   • Smoking status: Never Smoker    • Smokeless tobacco: Never Used   • Alcohol Use: No   • Drug Use: No   • Sexual Activity: Not on file       SURGICAL HISTORY   has past surgical history that includes hip nailing intramedullary (Left, 10/7/2015) and other orthopedic surgery.    CURRENT MEDICATIONS  Home Medications     **Home medications have not yet been reviewed for this encounter**          ALLERGIES  No Known  "Allergies    PHYSICAL EXAM  VITAL SIGNS: /89 mmHg  Pulse 47  Temp(Src) 36.6 °C (97.9 °F)  Resp 12  Ht 1.727 m (5' 7.99\")  Wt 63.5 kg (139 lb 15.9 oz)  BMI 21.29 kg/m2  SpO2 95%   Pulse ox interpretation: I interpret this pulse ox as normal.  Constitutional: Alert in no apparent distress.  HENT: No signs of trauma, Bilateral external ears normal, Nose normal.   Eyes: Pupils are equal and reactive, Conjunctiva normal, Non-icteric.   Neck: Normal range of motion, No tenderness, Supple, No stridor.   Lymphatic: No lymphadenopathy noted.   Cardiovascular: Regular rate and rhythm, no murmurs.   Thorax & Lungs: Normal breath sounds, No respiratory distress, No wheezing, No chest tenderness.   Abdomen: Bowel sounds normal, Soft, No tenderness, No masses, No pulsatile masses. No peritoneal signs.  Skin: Warm, Dry, No erythema, No rash.   Back: No bony tenderness, No CVA tenderness.   Extremities: Intact distal pulses, No edema, No tenderness, No cyanosis,  Negative Tae's sign.   Musculoskeletal: Good range of motion in all major joints. No tenderness to palpation or major deformities noted.   Neurologic: Alert , Normal motor function, Normal sensory function, No focal deficits noted.   Psychiatric: Affect normal, Judgment normal, Mood normal.       DIAGNOSTIC STUDIES / PROCEDURES    EKG  1916: Sinus bradycardia at a rate of 46, left axis deviation, no significant change from prior completed yesterday.    LABS  n/a    RADIOLOGY  n/a      COURSE & MEDICAL DECISION MAKING  Pertinent Labs & Imaging studies reviewed. (See chart for details)  Patient presented any MRSA primary for recurrent evaluation of elevated blood pressures. He remains asymptomatic. He has chronic history of bradycardia as he has prior athlete. On chart review this is chronic and unchanged from baseline. Chart review from yesterday's evaluation shows typical hypertensive evaluation without acute abnormalities. CT head was not completed although " again do not believe that we'll need be be completed tonight given lack of neuro signs or symptoms. EKG was completed without acute changes. Repeat blood pressures here in the emergency department in the 170s systolically. At this time the patient is wishing to forego any further recurrent testing tonight and will follow up with primary care physician for reevaluation and ongoing care after ongoing monitoring and ongoing use of enalapril as prescribed yesterday. I have asked that they continue blood pressure monitoring and should his blood pressure remained improved after morning dose but elevated in the evenings that they can repeat the 5 mg in the evening for a twice a day dosing for improved blood pressure control until follow-up with primary care physician. There are signs of trauma precautions the ER.      The patient will return for worsening symptoms and is stable at the time of discharge. The patient verbalizes understanding and will comply.    FINAL IMPRESSION  1. Essential hypertension    2. Bradycardia            Electronically signed by: Jono Cervantes, 1/28/2017 7:12 PM

## 2017-01-29 NOTE — ED NOTES
Discharge information provided. Pt verbalized understanding of discharge instructions to follow up with PCP and to return to ER if condition worsens. Pt ambulated out of ER in a steady gait with daughter, no additional questions or concerns. Pt has f/u with PCP this week, will record BP and take to appt. MD aware of BP and HR on d/c, ok for patient to go home. Pt took hoe dose of Enalapril prior to d/c as instructed by MD.

## 2017-01-29 NOTE — DISCHARGE INSTRUCTIONS
Bradycardia  You have a slow heart rate. This is called bradycardia. At rest, the normal heart rate is between  beats per minute. A slow heart may cause weakness, dizziness, loss of consciousness, and shortness of breath. This gets worse when you are active.   The medical causes of bradycardia can include:  · Heart and thyroid problems.   · High potassium.   · Side effects of some medicines.   Well-trained athletes may have heart rates as slow as 42 beats per minute. Evaluation of bradycardia may require an electrocardiogram (ECG), blood tests, and possibly other heart studies.   Bradycardia due to heart disease can be a serious problem. Damage to the heart's electrical system may require a temporary or permanent pacemaker. Beta-blocker drugs, digoxin, and other medicines used to control blood pressure and heart rhythms will also slow the heart. These medicines may need to be used in lower doses, or be stopped, if you have problems from the bradycardia.   SEEK IMMEDIATE MEDICAL CARE IF:   · You develop fainting, extreme weakness, shortness of breath, or fever.   · You develop severe chest or abdominal pain, repeated vomiting, or dehydration.   · You become sweaty and weak.   MAKE SURE YOU:   · Understand these instructions.   · Will watch your condition.   · Will get help right away if you are not doing well or get worse.   Document Released: 12/18/2006 Document Revised: 03/11/2013 Document Reviewed: 03/17/2010  [employer]® Patient Information ©2013 [employer].  Hypertension  Hypertension, commonly called high blood pressure, is when the force of blood pumping through your arteries is too strong. Your arteries are the blood vessels that carry blood from your heart throughout your body. A blood pressure reading consists of a higher number over a lower number, such as 110/72. The higher number (systolic) is the pressure inside your arteries when your heart pumps. The lower number (diastolic) is the pressure  inside your arteries when your heart relaxes. Ideally you want your blood pressure below 120/80.  Hypertension forces your heart to work harder to pump blood. Your arteries may become narrow or stiff. Having untreated or uncontrolled hypertension can cause heart attack, stroke, kidney disease, and other problems.  RISK FACTORS  Some risk factors for high blood pressure are controllable. Others are not.   Risk factors you cannot control include:   · Race. You may be at higher risk if you are .  · Age. Risk increases with age.  · Gender. Men are at higher risk than women before age 45 years. After age 65, women are at higher risk than men.  Risk factors you can control include:  · Not getting enough exercise or physical activity.  · Being overweight.  · Getting too much fat, sugar, calories, or salt in your diet.  · Drinking too much alcohol.  SIGNS AND SYMPTOMS  Hypertension does not usually cause signs or symptoms. Extremely high blood pressure (hypertensive crisis) may cause headache, anxiety, shortness of breath, and nosebleed.  DIAGNOSIS  To check if you have hypertension, your health care provider will measure your blood pressure while you are seated, with your arm held at the level of your heart. It should be measured at least twice using the same arm. Certain conditions can cause a difference in blood pressure between your right and left arms. A blood pressure reading that is higher than normal on one occasion does not mean that you need treatment. If it is not clear whether you have high blood pressure, you may be asked to return on a different day to have your blood pressure checked again. Or, you may be asked to monitor your blood pressure at home for 1 or more weeks.  TREATMENT  Treating high blood pressure includes making lifestyle changes and possibly taking medicine. Living a healthy lifestyle can help lower high blood pressure. You may need to change some of your habits.  Lifestyle  changes may include:  · Following the DASH diet. This diet is high in fruits, vegetables, and whole grains. It is low in salt, red meat, and added sugars.  · Keep your sodium intake below 2,300 mg per day.  · Getting at least 30-45 minutes of aerobic exercise at least 4 times per week.  · Losing weight if necessary.  · Not smoking.  · Limiting alcoholic beverages.  · Learning ways to reduce stress.  Your health care provider may prescribe medicine if lifestyle changes are not enough to get your blood pressure under control, and if one of the following is true:  · You are 18-59 years of age and your systolic blood pressure is above 140.  · You are 60 years of age or older, and your systolic blood pressure is above 150.  · Your diastolic blood pressure is above 90.  · You have diabetes, and your systolic blood pressure is over 140 or your diastolic blood pressure is over 90.  · You have kidney disease and your blood pressure is above 140/90.  · You have heart disease and your blood pressure is above 140/90.  Your personal target blood pressure may vary depending on your medical conditions, your age, and other factors.  HOME CARE INSTRUCTIONS  · Have your blood pressure rechecked as directed by your health care provider.    · Take medicines only as directed by your health care provider. Follow the directions carefully. Blood pressure medicines must be taken as prescribed. The medicine does not work as well when you skip doses. Skipping doses also puts you at risk for problems.  · Do not smoke.    · Monitor your blood pressure at home as directed by your health care provider.   SEEK MEDICAL CARE IF:   · You think you are having a reaction to medicines taken.  · You have recurrent headaches or feel dizzy.  · You have swelling in your ankles.  · You have trouble with your vision.  SEEK IMMEDIATE MEDICAL CARE IF:  · You develop a severe headache or confusion.  · You have unusual weakness, numbness, or feel faint.  · You  have severe chest or abdominal pain.  · You vomit repeatedly.  · You have trouble breathing.  MAKE SURE YOU:   · Understand these instructions.  · Will watch your condition.  · Will get help right away if you are not doing well or get worse.     This information is not intended to replace advice given to you by your health care provider. Make sure you discuss any questions you have with your health care provider.     Document Released: 12/18/2006 Document Revised: 05/03/2016 Document Reviewed: 10/10/2014  [employer] Interactive Patient Education ©2016 [employer]

## 2017-04-28 ENCOUNTER — OFFICE VISIT (OUTPATIENT)
Dept: CARDIOLOGY | Facility: MEDICAL CENTER | Age: 82
End: 2017-04-28
Payer: MEDICARE

## 2017-04-28 VITALS
DIASTOLIC BLOOD PRESSURE: 80 MMHG | OXYGEN SATURATION: 99 % | SYSTOLIC BLOOD PRESSURE: 140 MMHG | HEART RATE: 56 BPM | WEIGHT: 142 LBS | HEIGHT: 68 IN | BODY MASS INDEX: 21.52 KG/M2

## 2017-04-28 DIAGNOSIS — I47.10 SVT (SUPRAVENTRICULAR TACHYCARDIA): ICD-10-CM

## 2017-04-28 DIAGNOSIS — I44.4 LAFB (LEFT ANTERIOR FASCICULAR BLOCK): ICD-10-CM
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
## 2017-04-28 PROCEDURE — G8432 DEP SCR NOT DOC, RNG: HCPCS | Performed by: INTERNAL MEDICINE

## 2017-04-28 PROCEDURE — 1036F TOBACCO NON-USER: CPT | Performed by: INTERNAL MEDICINE

## 2017-04-28 PROCEDURE — G8420 CALC BMI NORM PARAMETERS: HCPCS | Performed by: INTERNAL MEDICINE

## 2017-04-28 PROCEDURE — 99214 OFFICE O/P EST MOD 30 MIN: CPT | Performed by: INTERNAL MEDICINE

## 2017-04-28 PROCEDURE — 4040F PNEUMOC VAC/ADMIN/RCVD: CPT | Performed by: INTERNAL MEDICINE

## 2017-04-28 PROCEDURE — 1101F PT FALLS ASSESS-DOCD LE1/YR: CPT | Mod: 8P | Performed by: INTERNAL MEDICINE

## 2017-04-28 RX ORDER — CLONIDINE HYDROCHLORIDE 0.1 MG/1
0.1 TABLET ORAL 2 TIMES DAILY PRN
Qty: 30 TAB | Refills: 11 | Status: SHIPPED | OUTPATIENT
Start: 2017-04-28 | End: 2018-03-17

## 2017-04-28 ASSESSMENT — ENCOUNTER SYMPTOMS
DIZZINESS: 1
LOSS OF CONSCIOUSNESS: 1
CARDIOVASCULAR NEGATIVE: 1
SEIZURES: 0
MEMORY LOSS: 1

## 2017-04-28 NOTE — Clinical Note
Name:          Schwab, Frederick   YOB: 1929  Date:     4/28/2017      Dave Mack D.O.  [address]  [facility] [address]     Mauro Garza MD  [address], [address]  [place] [address]  Phone: [phone]  Back Line: [phone]  Fax: [phone]  E-mail: [email or web address]   Dear Dr. Mack,    We had the pleasure of seeing your patient, Frederick Schwab, in Cardiology Clinic at [facility] today.    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    He is doing well today, and has had no syncopal episodes since our last visit. I reviewed with him the results of his echocardiogram that demonstrated normal left ventricular ejection fraction, and no significant valvular disease. We also reviewed the results of his Holter monitor that showed no significant bradycardia arrhythmias in light of his left anterior fascicular block. His blood pressure does continue to be quite labile, and we measured it at 140/80 mmHg in the office. It does drop as low as the 110s over 50s mmHg at home though he has been into the emergency room on 2 occasions for systolic blood pressures in excess of 160 mmHg. For his low blood pressures I advised crossed leg squeezes and hydration. For his high blood pressures I prescribed clonidine 0.1 mg by mouth twice a day to take as needed when blood pressures exceed 160 mmHg to keep him out of the emergency room.    We will have the patient follow-up in one year.    Thank you for the referral and please do not hesitate to contact me at any time. My contact information is listed above.    This note was dictated using Dragon speech recognition software.     A full note including my physical examination and a full list of rectified medications is available in our medical  record, and can be faxed as well.    Mauro Garza MD  Cardiologist  [facility]

## 2017-04-28 NOTE — PROGRESS NOTES
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    He is doing well today, and has minimal orthostasis. However, since the time of our last visit he has been into the emergency room a couple of times with systolic blood pressures above 160 mmHg. On both occasions he was worked up and discharged without admission appropriately. His blood pressures at rest or in the 110s mmHg over 50s mmHg. Otherwise, cardiovascular review of systems is negative.    We reviewed today the results of his echocardiogram, and Holter monitor. In the setting of his conduction system disease there was no bradycardia arrhythmia of significance observed. He had short runs of SVT, which are asymptomatic and of no clinical significance.    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    Past Medical History   Diagnosis Date   • Cancer (CMS-HCC)      skin cancer      Past Surgical History   Procedure Laterality Date   • Hip nailing intramedullary Left 10/7/2015     Procedure: HIP NAILING INTRAMEDULLARY- Intertan ;  Surgeon: Sloan Sin M.D.;  Location: SURGERY [facility];  Service:    • Other orthopedic surgery       left hip replacement     Family History   Problem Relation Age of Onset   • Stroke Father 67     History   Smoking status   • Never Smoker    Smokeless tobacco   • Never Used     No Known Allergies  Outpatient Encounter Prescriptions as of 4/28/2017   Medication Sig Dispense Refill   • clonidine (CATAPRES) 0.1 MG Tab Take 1 Tab by mouth 2 times a day as needed (for SBP > 160 mmHg). 30 Tab 11   • donepezil (ARICEPT) 5 MG Tab Take 5 mg by mouth every evening.     • IRON PO Take 1 Tab by mouth every day.     • enalapril (VASOTEC) 5 MG Tab Take 1 Tab by mouth  "every day. 30 Tab 0   • CALCIUM PO Take 1 Tab by mouth every morning.     • therapeutic multivitamin-minerals (THERAGRAN-M) Tab Take 1 Tab by mouth every morning.       No facility-administered encounter medications on file as of 4/28/2017.     Review of Systems   Cardiovascular: Negative.    Musculoskeletal: Positive for joint pain.   Neurological: Positive for dizziness and loss of consciousness. Negative for seizures.        Vertigo   Psychiatric/Behavioral: Positive for memory loss.   All other systems reviewed and are negative.       Objective:   /80 mmHg  Pulse 56  Ht 1.727 m (5' 7.99\")  Wt 64.411 kg (142 lb)  BMI 21.60 kg/m2  SpO2 99%    Physical Exam   Constitutional: He is oriented to person, place, and time. He appears well-developed and well-nourished. No distress.   Very pleasant elderly man accompanied by his son-in-law today in no distress   HENT:   Head: Normocephalic and atraumatic.   Eyes: Conjunctivae and EOM are normal. Pupils are equal, round, and reactive to light. No scleral icterus.   Neck: Neck supple. No JVD present. No tracheal deviation present.   Cardiovascular: Normal rate, regular rhythm, normal heart sounds and intact distal pulses.  Exam reveals no gallop and no friction rub.    No murmur heard.  Pulses:       Dorsalis pedis pulses are 2+ on the right side, and 2+ on the left side.   No carotid bruits   Pulmonary/Chest: Effort normal and breath sounds normal. No stridor. No respiratory distress. He has no wheezes. He has no rales.   Abdominal: Soft. Bowel sounds are normal. He exhibits no distension.   Musculoskeletal: He exhibits no edema.   Neurological: He is alert and oriented to person, place, and time.   Skin: Skin is warm and dry. No rash noted. He is not diaphoretic. No erythema. No pallor.   Psychiatric: He has a normal mood and affect. Judgment and thought content normal.   Vitals reviewed.    Holter monitor, 12/29/2016:  \"Interpretive Statements   *  Monitoring " "started at 10:09 AM and continued for 48 hours. Cardiac rhythm   is Sinus Bradycardia. Heart rate in the   range of 41 to 92 BPM. Average heart rate was 57 BPM. The longest R-R   interval was 1.6 seconds.   *  Ventricular ectopic activity consisted of three couplets, 47 single PVCs,   eight interpolated PVCs.   *  Supraventricular ectopic activity consisted of seven atrial runs in which   the longest run occurred at 6:37:15 PM D2   consisting of 15 beats, with maximum heart rate of 117 BPM. The fastest   run occurred at 5:55:15 AM D1, consisting   of six beats, with maximum heart rate of 160 BPM, 44 atrial couplets, 139   single PACs.   *  Diary entries - all symptoms reported in diary correlates with sinus   rhythm with no ectopics at the time of symptoms   occurrence. Heart rate during symptoms 52 to 77 BPM.     Summary: short runs of SVT, likely atrial tachycardia, not dangerous\"    Echocardiogram, 11/7/2016:  \"CONCLUSIONS  1. Left ventricular ejection fraction is visually estimated to be 65%.   Grade I diastolic dysfunction.    2. No valvular heart disease.    3. Estimated right ventricular systolic pressure  is 40 mmHg\"    Assessment:     1. Elevated blood pressure  clonidine (CATAPRES) 0.1 MG Tab   2. Fall from ground level     3. LAFB (left anterior fascicular block)         Medical Decision Making:  Today's Assessment / Status / Plan:     Medical Decision Making:    He is doing well today, and has had no syncopal episodes since our last visit. I reviewed with him the results of his echocardiogram that demonstrated normal left ventricular ejection fraction, and no significant valvular disease. We also reviewed the results of his Holter monitor that showed no significant bradycardia arrhythmias in light of his left anterior fascicular block. His blood pressure does continue to be quite labile, and we measured it at 140/80 mmHg in the office. It does drop as low as the 110s over 50s mmHg at home though he has " been into the emergency room on 2 occasions for systolic blood pressures in excess of 160 mmHg. For his low blood pressures I advised crossed leg squeezes and hydration. For his high blood pressures I prescribed clonidine 0.1 mg by mouth twice a day to take as needed when blood pressures exceed 160 mmHg to keep him out of the emergency room.    Mauro Garza MD  Cardiologist, [facility] [place]

## 2017-04-28 NOTE — MR AVS SNAPSHOT
"        Neftali Villatoro Schwab   2017 9:45 AM   Office Visit   MRN: [id number]    Department:  [facility]   Dept Phone:  [phone]    Description:  Male : 10/15/1929   Provider:  Mauro Garza M.D.           Reason for Visit     Follow-Up           Allergies as of 2017     No Known Allergies      You were diagnosed with     Elevated blood pressure   [[id number]]         Vital Signs     Blood Pressure Pulse Height Weight Body Mass Index Oxygen Saturation    140/80 mmHg 56 1.727 m (5' 7.99\") 64.411 kg (142 lb) 21.60 kg/m2 99%    Smoking Status                   Never Smoker            Basic Information     Date Of Birth Sex Race Ethnicity Preferred Language    10/15/1929 Male White Non- English      Problem List              ICD-10-CM Priority Class Noted - Resolved    Femur fracture, left (CMS-HCC) S72.92XA   10/7/2015 - Present    Orthostatic hypotension I95.1   2016 - Present    Fall from ground level W18.30XA High  2016 - Present    Normocytic anemia D64.9   2016 - Present    Hyperglycemia R73.9   2016 - Present    URIEL (acute kidney injury) (CMS-HCC) N17.9 High  2016 - Present    Elevated brain natriuretic peptide (BNP) level R79.89   2016 - Present      Health Maintenance        Date Due Completion Dates    IMM DTaP/Tdap/Td Vaccine (1 - Tdap) 10/15/1948 ---    COLONOSCOPY 10/15/1979 ---    IMM ZOSTER VACCINE 10/15/1989 ---    IMM PNEUMOCOCCAL 65+ (ADULT) LOW/MEDIUM RISK SERIES (2 of 2 - PPSV23) 2017            Current Immunizations     13-VALENT PCV PREVNAR 2016 12:34 PM    Influenza Vaccine Adult HD 2016  5:39 AM      Below and/or attached are the medications your provider expects you to take. Review all of your home medications and newly ordered medications with your provider and/or pharmacist. Follow medication instructions as directed by your provider and/or pharmacist. Please keep your medication list with you and share with " your provider. Update the information when medications are discontinued, doses are changed, or new medications (including over-the-counter products) are added; and carry medication information at all times in the event of emergency situations     Allergies:  No Known Allergies          Medications  Valid as of: April 28, 2017 - 10:19 AM    Generic Name Brand Name Tablet Size Instructions for use    Calcium   Take 1 Tab by mouth every morning.        CloNIDine HCl (Tab) CATAPRES 0.1 MG Take 1 Tab by mouth 2 times a day as needed (for SBP > 160 mmHg).        Donepezil HCl (Tab) ARICEPT 5 MG Take 5 mg by mouth every evening.        Enalapril Maleate (Tab) VASOTEC 5 MG Take 1 Tab by mouth every day.        Iron   Take 1 Tab by mouth every day.        Multiple Vitamins-Minerals (Tab) THERAGRAN-M  Take 1 Tab by mouth every morning.        .                 Medicines prescribed today were sent to:     [id number] - [place], [place] - [address]    [address] [place] [address]    Phone: [phone] Fax: [phone]    Open 24 Hours?: No      Medication refill instructions:       If your prescription bottle indicates you have medication refills left, it is not necessary to call your provider’s office. Please contact your pharmacy and they will refill your medication.    If your prescription bottle indicates you do not have any refills left, you may request refills at any time through one of the following ways: The online [facility] system (except Urgent Care), by calling your provider’s office, or by asking your pharmacy to contact your provider’s office with a refill request. Medication refills are processed only during regular business hours and may not be available until the next business day. Your provider may request additional information or to have a follow-up visit with you prior to refilling your medication.   *Please Note: Medication refills are assigned a new Rx number when refilled electronically.  Your pharmacy may indicate that no refills were authorized even though a new prescription for the same medication is available at the pharmacy. Please request the medicine by name with the pharmacy before contacting your provider for a refill.           MyChart Access Code: Activation code not generated  Current Perk Dynamicst Status: Active

## 2017-10-23 ENCOUNTER — APPOINTMENT (OUTPATIENT)
Dept: SOCIAL WORK | Facility: CLINIC | Age: 82
End: 2017-10-23
Payer: MEDICARE

## 2017-10-23 PROCEDURE — G0008 ADMIN INFLUENZA VIRUS VAC: HCPCS | Performed by: REGISTERED NURSE

## 2017-10-23 PROCEDURE — 90670 PCV13 VACCINE IM: CPT | Performed by: REGISTERED NURSE

## 2017-10-23 PROCEDURE — 90662 IIV NO PRSV INCREASED AG IM: CPT | Performed by: REGISTERED NURSE

## 2017-10-23 PROCEDURE — G0009 ADMIN PNEUMOCOCCAL VACCINE: HCPCS | Performed by: REGISTERED NURSE

## 2017-12-26 ENCOUNTER — HOSPITAL ENCOUNTER (OUTPATIENT)
Dept: RADIOLOGY | Facility: MEDICAL CENTER | Age: 82
End: 2017-12-26
Attending: PHYSICIAN ASSISTANT
Payer: MEDICARE

## 2017-12-26 DIAGNOSIS — R05.9 COUGH: ICD-10-CM

## 2017-12-26 PROCEDURE — 71020 DX-CHEST-2 VIEWS: CPT

## 2018-03-17 ENCOUNTER — APPOINTMENT (OUTPATIENT)
Dept: RADIOLOGY | Facility: MEDICAL CENTER | Age: 83
End: 2018-03-17
Attending: EMERGENCY MEDICINE
Payer: MEDICARE

## 2018-03-17 ENCOUNTER — HOSPITAL ENCOUNTER (EMERGENCY)
Facility: MEDICAL CENTER | Age: 83
End: 2018-03-17
Attending: EMERGENCY MEDICINE
Payer: MEDICARE

## 2018-03-17 VITALS
SYSTOLIC BLOOD PRESSURE: 133 MMHG | HEART RATE: 95 BPM | RESPIRATION RATE: 18 BRPM | DIASTOLIC BLOOD PRESSURE: 81 MMHG | OXYGEN SATURATION: 98 % | TEMPERATURE: 98.6 F | WEIGHT: 147.71 LBS | HEIGHT: 68 IN | BODY MASS INDEX: 22.39 KG/M2

## 2018-03-17 DIAGNOSIS — R05.9 COUGH: ICD-10-CM

## 2018-03-17 PROCEDURE — 99284 EMERGENCY DEPT VISIT MOD MDM: CPT

## 2018-03-17 PROCEDURE — 71045 X-RAY EXAM CHEST 1 VIEW: CPT

## 2018-03-17 RX ORDER — ALBUTEROL SULFATE 90 UG/1
1-2 AEROSOL, METERED RESPIRATORY (INHALATION) EVERY 6 HOURS PRN
Status: SHIPPED | COMMUNITY
End: 2019-06-08

## 2018-03-17 RX ORDER — ENALAPRIL MALEATE 10 MG/1
5 TABLET ORAL DAILY
COMMUNITY

## 2018-03-17 RX ORDER — TRIAMCINOLONE ACETONIDE 1 MG/G
CREAM TOPICAL 2 TIMES DAILY PRN
Status: SHIPPED | COMMUNITY
End: 2019-03-05

## 2018-03-17 RX ORDER — AZITHROMYCIN 250 MG/1
TABLET, FILM COATED ORAL
Qty: 6 TAB | Refills: 0 | Status: SHIPPED | OUTPATIENT
Start: 2018-03-17 | End: 2019-03-05

## 2018-03-17 ASSESSMENT — PAIN SCALES - GENERAL
PAINLEVEL_OUTOF10: 0
PAINLEVEL_OUTOF10: ASSUMED PAIN PRESENT

## 2018-03-17 NOTE — ED NOTES
The Medication Reconciliation process has been completed by interviewing the patient    Allergies have been reviewed  Antibiotic use in 30 days - none    Home Pharmacy:  [facility] [place]

## 2018-03-17 NOTE — ED PROVIDER NOTES
"ED Provider Note    CHIEF COMPLAINT  Chief Complaint   Patient presents with   • Cough       HPI  Frederick Louis Schwab is a 88 y.o. male who presents complaining of a cough. He's had this for about a month but has been getting worse over the last 2 weeks. He has not had a fever. He denies any chest pain. He does feel quite a bit of congestion in his chest. He denies any PND or orthopnea. No belly pain. No leg pain or swelling. He's had some runny nose as well. There is no other complaint.    PAST MEDICAL HISTORY  Past Medical History:   Diagnosis Date   • Cancer (CMS-HCC)     skin cancer        FAMILY HISTORY  Family History   Problem Relation Age of Onset   • Stroke Father 67       SOCIAL HISTORY  Social History   Substance Use Topics   • Smoking status: Never Smoker   • Smokeless tobacco: Never Used   • Alcohol use No     Is here with his niece    SURGICAL HISTORY  Past Surgical History:   Procedure Laterality Date   • HIP NAILING INTRAMEDULLARY Left 10/7/2015    Procedure: HIP NAILING INTRAMEDULLARY- Intertan ;  Surgeon: Sloan Sin M.D.;  Location: SURGERY [facility];  Service:    • OTHER ORTHOPEDIC SURGERY      left hip replacement       CURRENT MEDICATIONS  Home Medications     Reviewed by Celeste Tipton (Pharmacy Tech) on 03/17/18 at 1527  Med List Status: Complete   Medication Last Dose Status   albuterol 108 (90 Base) MCG/ACT Aero Soln inhalation aerosol 3/16/2018 Active   enalapril (VASOTEC) 10 MG Tab 3/17/2018 Active   triamcinolone acetonide (KENALOG) 0.1 % Cream <week Active                I have reviewed the nurses notes and/or the list brought with the patient.    ALLERGIES  No Known Allergies    REVIEW OF SYSTEMS  See HPI for further details. Review of systems as above, otherwise all other systems are negative.     PHYSICAL EXAM  VITAL SIGNS: /95   Pulse (!) 110   Temp 36.6 °C (97.8 °F)   Resp 18   Ht 1.727 m (5' 8\") Comment: Stated  Wt 67 kg (147 lb 11.3 oz)   SpO2 " 98%   BMI 22.46 kg/m²   Constitutional: Although he is well-appearing, he has a deep, moist cough. Not toxic or ill in appearance.  HENT: Mucus membranes moist.  Oropharynx is clear.  Eyes: Pupils equally round.  No scleral icterus.   Neck: Full nontender range of motion.  Lymphatic: No cervical lymphadenopathy noted.   Cardiovascular: Triage heart rate is noted, my count was in the 80s.  No murmurs, rubs, nor gallop appreciated.   Thorax & Lungs: Chest is nontender.  Lungs are notable for scattered rhonchi on the right  Abdomen: Soft, with no tenderness, rebound nor guarding.  No mass, pulsatile mass, nor hepatosplenomegaly appreciated.  Skin: No purpura nor petechia noted.  Extremities/Musculoskeletal: No sign of trauma.  Calves are nontender with no cords nor edema.  No Tae's sign.  Pulses are intact all around.   Neurologic: Alert & oriented.  Strength and sensation is intact all around.  Gait is normal.  Psychiatric: Normal affect appropriate for the clinical situation.      RADIOLOGY/PROCEDURES  I have reviewed the patient's film interpretations myself, and they are read out by the radiologist as:   DX-CHEST-PORTABLE (1 VIEW)   Final Result      Stable prominence of the cardiomediastinal silhouette.      Atherosclerotic plaque.        .    MEDICAL RECORD  I have reviewed patient's medical record and pertinent results are listed above.    COURSE & MEDICAL DECISION MAKING  I have reviewed any medical record information, laboratory studies and radiographic results as noted above.  Patient presents with a cough. He does have some abdominal breath sounds on the right. Given the duration of symptoms and the clinical findings, I would start him on antibiotics. However, given his age, I have checked a x-ray to ensure that there is no evidence of parapneumonic effusion mass or pneumothorax. There is not. At this point, put the patient on azithromycin. His saturations are normal, his vital signs are unrevealing.  Specifically there is no tachycardia, his blood pressures normal. We'll discharge with instructions on cough. I would like him to follow up with his personal doctor this upcoming week for recheck. Obviously, should anything take a turn for the worse he is to return to the ER.    FINAL IMPRESSION  1. Cough           This dictation was created using voice recognition software.    Electronically signed by: Kevin Elmore, 3/17/2018 3:32 PM

## 2018-03-18 NOTE — DISCHARGE INSTRUCTIONS

## 2018-03-18 NOTE — ED NOTES
1530 pt to room 1 cough dry cough  1600 pcxr completed  1700 All results back, chart up for MD for re evaluation. poc update given to pt. No further questions at this time. Further orders and dispo pending  1726 D/c pt home,with family  rx given . Pt and family aware of f/u instructions , aware to return for any changes or concerns. No further questions upon d/c home from ed

## 2018-10-19 ENCOUNTER — IMMUNIZATION (OUTPATIENT)
Dept: SOCIAL WORK | Facility: CLINIC | Age: 83
End: 2018-10-19
Payer: MEDICARE

## 2018-10-19 DIAGNOSIS — Z23 NEED FOR VACCINATION: ICD-10-CM

## 2018-10-19 PROCEDURE — G0008 ADMIN INFLUENZA VIRUS VAC: HCPCS | Performed by: REGISTERED NURSE

## 2018-10-19 PROCEDURE — 90662 IIV NO PRSV INCREASED AG IM: CPT | Performed by: REGISTERED NURSE

## 2019-03-05 ENCOUNTER — APPOINTMENT (OUTPATIENT)
Dept: RADIOLOGY | Facility: MEDICAL CENTER | Age: 84
End: 2019-03-05
Payer: MEDICARE

## 2019-03-05 ENCOUNTER — HOSPITAL ENCOUNTER (EMERGENCY)
Facility: MEDICAL CENTER | Age: 84
End: 2019-03-05
Attending: EMERGENCY MEDICINE
Payer: MEDICARE

## 2019-03-05 VITALS
WEIGHT: 136.24 LBS | RESPIRATION RATE: 17 BRPM | SYSTOLIC BLOOD PRESSURE: 132 MMHG | TEMPERATURE: 97.7 F | DIASTOLIC BLOOD PRESSURE: 61 MMHG | OXYGEN SATURATION: 99 % | BODY MASS INDEX: 20.72 KG/M2 | HEART RATE: 57 BPM

## 2019-03-05 DIAGNOSIS — I49.9 IRREGULAR HEARTBEAT: ICD-10-CM

## 2019-03-05 DIAGNOSIS — N30.01 ACUTE CYSTITIS WITH HEMATURIA: ICD-10-CM

## 2019-03-05 LAB
ALBUMIN SERPL BCP-MCNC: 4.4 G/DL (ref 3.2–4.9)
ALBUMIN/GLOB SERPL: 1.8 G/DL
ALP SERPL-CCNC: 114 U/L (ref 30–99)
ALT SERPL-CCNC: 13 U/L (ref 2–50)
ANION GAP SERPL CALC-SCNC: 9 MMOL/L (ref 0–11.9)
APTT PPP: 33.2 SEC (ref 24.7–36)
AST SERPL-CCNC: 18 U/L (ref 12–45)
BASOPHILS # BLD AUTO: 0.5 % (ref 0–1.8)
BASOPHILS # BLD: 0.04 K/UL (ref 0–0.12)
BILIRUB SERPL-MCNC: 0.5 MG/DL (ref 0.1–1.5)
BNP SERPL-MCNC: 110 PG/ML (ref 0–100)
BUN SERPL-MCNC: 38 MG/DL (ref 8–22)
CALCIUM SERPL-MCNC: 9.9 MG/DL (ref 8.5–10.5)
CHLORIDE SERPL-SCNC: 104 MMOL/L (ref 96–112)
CO2 SERPL-SCNC: 23 MMOL/L (ref 20–33)
CREAT SERPL-MCNC: 1.42 MG/DL (ref 0.5–1.4)
EOSINOPHIL # BLD AUTO: 0.15 K/UL (ref 0–0.51)
EOSINOPHIL NFR BLD: 1.8 % (ref 0–6.9)
ERYTHROCYTE [DISTWIDTH] IN BLOOD BY AUTOMATED COUNT: 49 FL (ref 35.9–50)
GLOBULIN SER CALC-MCNC: 2.5 G/DL (ref 1.9–3.5)
GLUCOSE SERPL-MCNC: 171 MG/DL (ref 65–99)
HCT VFR BLD AUTO: 40.5 % (ref 42–52)
HGB BLD-MCNC: 12.9 G/DL (ref 14–18)
IMM GRANULOCYTES # BLD AUTO: 0.02 K/UL (ref 0–0.11)
IMM GRANULOCYTES NFR BLD AUTO: 0.2 % (ref 0–0.9)
INR PPP: 1.1 (ref 0.87–1.13)
LIPASE SERPL-CCNC: 19 U/L (ref 11–82)
LYMPHOCYTES # BLD AUTO: 2.23 K/UL (ref 1–4.8)
LYMPHOCYTES NFR BLD: 26 % (ref 22–41)
MCH RBC QN AUTO: 31.3 PG (ref 27–33)
MCHC RBC AUTO-ENTMCNC: 31.9 G/DL (ref 33.7–35.3)
MCV RBC AUTO: 98.3 FL (ref 81.4–97.8)
MONOCYTES # BLD AUTO: 0.74 K/UL (ref 0–0.85)
MONOCYTES NFR BLD AUTO: 8.6 % (ref 0–13.4)
NEUTROPHILS # BLD AUTO: 5.39 K/UL (ref 1.82–7.42)
NEUTROPHILS NFR BLD: 62.9 % (ref 44–72)
NRBC # BLD AUTO: 0 K/UL
NRBC BLD-RTO: 0 /100 WBC
PLATELET # BLD AUTO: 193 K/UL (ref 164–446)
PMV BLD AUTO: 10.2 FL (ref 9–12.9)
POTASSIUM SERPL-SCNC: 5 MMOL/L (ref 3.6–5.5)
PROT SERPL-MCNC: 6.9 G/DL (ref 6–8.2)
PROTHROMBIN TIME: 14.3 SEC (ref 12–14.6)
RBC # BLD AUTO: 4.12 M/UL (ref 4.7–6.1)
SODIUM SERPL-SCNC: 136 MMOL/L (ref 135–145)
TROPONIN I SERPL-MCNC: <0.01 NG/ML (ref 0–0.04)
WBC # BLD AUTO: 8.6 K/UL (ref 4.8–10.8)

## 2019-03-05 PROCEDURE — 83880 ASSAY OF NATRIURETIC PEPTIDE: CPT

## 2019-03-05 PROCEDURE — 85730 THROMBOPLASTIN TIME PARTIAL: CPT

## 2019-03-05 PROCEDURE — 93005 ELECTROCARDIOGRAM TRACING: CPT | Performed by: EMERGENCY MEDICINE

## 2019-03-05 PROCEDURE — 80053 COMPREHEN METABOLIC PANEL: CPT

## 2019-03-05 PROCEDURE — 87086 URINE CULTURE/COLONY COUNT: CPT

## 2019-03-05 PROCEDURE — 93005 ELECTROCARDIOGRAM TRACING: CPT

## 2019-03-05 PROCEDURE — 84484 ASSAY OF TROPONIN QUANT: CPT

## 2019-03-05 PROCEDURE — 99284 EMERGENCY DEPT VISIT MOD MDM: CPT

## 2019-03-05 PROCEDURE — A9270 NON-COVERED ITEM OR SERVICE: HCPCS | Performed by: EMERGENCY MEDICINE

## 2019-03-05 PROCEDURE — 83690 ASSAY OF LIPASE: CPT

## 2019-03-05 PROCEDURE — 700102 HCHG RX REV CODE 250 W/ 637 OVERRIDE(OP): Performed by: EMERGENCY MEDICINE

## 2019-03-05 PROCEDURE — 85025 COMPLETE CBC W/AUTO DIFF WBC: CPT

## 2019-03-05 PROCEDURE — 85610 PROTHROMBIN TIME: CPT

## 2019-03-05 PROCEDURE — 71045 X-RAY EXAM CHEST 1 VIEW: CPT

## 2019-03-05 RX ORDER — SULFAMETHOXAZOLE AND TRIMETHOPRIM 400; 80 MG/1; MG/1
1 TABLET ORAL 2 TIMES DAILY
Qty: 20 TAB | Refills: 0 | Status: SHIPPED | OUTPATIENT
Start: 2019-03-05 | End: 2019-03-18

## 2019-03-05 RX ORDER — FERROUS SULFATE 325(65) MG
325 TABLET ORAL DAILY
COMMUNITY

## 2019-03-05 RX ORDER — SULFAMETHOXAZOLE AND TRIMETHOPRIM 400; 80 MG/1; MG/1
1 TABLET ORAL ONCE
Status: COMPLETED | OUTPATIENT
Start: 2019-03-05 | End: 2019-03-05

## 2019-03-05 RX ADMIN — SULFAMETHOXAZOLE AND TRIMETHOPRIM 1 TABLET: 400; 80 TABLET ORAL at 19:02

## 2019-03-05 NOTE — ED TRIAGE NOTES
Pt seen at urgent care for bladder infection, given script there. While he was there the PA believed she auscultated irregular heart beat. Pt sinus erlin on EKG and told to come to ED.

## 2019-03-06 NOTE — ED NOTES
Med rec updated and complete.  Allergies reviewed.  Pt denies oral antibiotic use in last  30 days at home.  However, pt was seen today at PCP and was given a prescription   For ciprofloxacin for a UTI. No doses taken.

## 2019-03-06 NOTE — ED PROVIDER NOTES
ED Provider Note    CHIEF COMPLAINT  Chief Complaint   Patient presents with   • Sent from Urgent Care   • Abnormal EKG       HPI  Frederick Louis Schwab is a 89 y.o. male who presents to emerge from today sent in by urgent care for abnormal EKG. patient initially went to urgent care for burning with urination for 2 weeks urine was positive for urinary tract infection.  While he was there they noticed fluctuation in his EKG specifically his heart rate would fluctuate from 50s-100 I have a concern that he may have arrhythmia and sent into the emergency room.  Patient denied any chest pain or shortness breath no fever chills or other complaints.    REVIEW OF SYSTEMS  See HPI for further details. All other systems are negative.     PAST MEDICAL HISTORY  Past Medical History:   Diagnosis Date   • Cancer (CMS-HCC) (HCC)     skin cancer        FAMILY HISTORY  [unfilled]    SOCIAL HISTORY  Social History     Social History   • Marital status: Single     Spouse name: N/A   • Number of children: N/A   • Years of education: N/A     Social History Main Topics   • Smoking status: Never Smoker   • Smokeless tobacco: Never Used   • Alcohol use No   • Drug use: No   • Sexual activity: Not on file     Other Topics Concern   • Not on file     Social History Narrative   • No narrative on file       SURGICAL HISTORY  Past Surgical History:   Procedure Laterality Date   • HIP NAILING INTRAMEDULLARY Left 10/7/2015    Procedure: HIP NAILING INTRAMEDULLARY- Intertan ;  Surgeon: Sloan Sin M.D.;  Location: [facility];  Service:    • OTHER ORTHOPEDIC SURGERY      left hip replacement       CURRENT MEDICATIONS  Home Medications    **Home medications have not yet been reviewed for this encounter**         ALLERGIES  No Known Allergies    PHYSICAL EXAM  VITAL SIGNS: /61   Pulse (!) 57   Temp 36.5 °C (97.7 °F) (Oral)   Resp 17   Wt 61.8 kg (136 lb 3.9 oz)   SpO2 99%   BMI 20.72 kg/m²  Room air O2:  98    Constitutional: Well developed, Well nourished, No acute distress, Non-toxic appearance.   HENT: Normocephalic, Atraumatic, Bilateral external ears normal, Oropharynx moist, No oral exudates, Nose normal.   Eyes: PERRLA, EOMI, Conjunctiva normal, No discharge.   Neck: Normal range of motion, No tenderness, Supple, No stridor.   Lymphatic: No lymphadenopathy noted.   Cardiovascular: Normal heart rate, Normal rhythm, No murmurs, No rubs, No gallops.   Thorax & Lungs: Normal breath sounds, No respiratory distress, No wheezing, No chest tenderness.   Abdomen: Bowel sounds normal, Soft, No tenderness, No masses, No pulsatile masses.   Skin: Warm, Dry, No erythema, No rash.   Back: No tenderness, No CVA tenderness.   Extremities: Intact distal pulses, No edema, No tenderness, No cyanosis, No clubbing.   Musculoskeletal: Good range of motion in all major joints. No tenderness to palpation or major deformities noted.   Neurologic: Alert & oriented x 3, Normal motor function, Normal sensory function, No focal deficits noted.   Psychiatric: Affect normal, Judgment normal, Mood normal.     EKG  Sinus bradycardia 57 bpm, no ST elevation or depression, no widening of the QRS complex, poor R wave progression, OH intervals are normal, no diagnostic Q waves noted.  This is a 12-lead EKG there is no previous EKG available at this time for comparison.    RADIOLOGY/PROCEDURES  DX-CHEST-LIMITED (1 VIEW)   Final Result      New mild left basilar atelectasis      Stable moderate cardiac silhouette, aortic arch and hilar, likely to represent vascular structures/pulmonary arterial hypertension, enlargement            COURSE & MEDICAL DECISION MAKING  Pertinent Labs & Imaging studies reviewed. (See chart for details)  Patient's remained in sinus rhythm throughout his stay here he may have had some fluctuation arrhythmia not noticed at this time will need a Holter monitor discussed case with cardiology Dr. Reilly/follow in the  office set him up for Holter monitor he started on treatment for his UTI with Bactrim discussed this with pharmacist.  Return if further problem    FINAL IMPRESSION  1.  Acute cardiac arrhythmia  2.  UTI  3.         Electronically signed by: Cooper Maloney, 3/5/2019 11:27 PM

## 2019-03-07 LAB
BACTERIA UR CULT: ABNORMAL
BACTERIA UR CULT: ABNORMAL
SIGNIFICANT IND 70042: ABNORMAL
SITE SITE: ABNORMAL
SOURCE SOURCE: ABNORMAL

## 2019-03-09 NOTE — ED NOTES
ED Positive Culture Follow-up/Notification Note:   Date: 3/8/19    Patient seen in the ED on 3/5/2019 as a recommendation from urgent care that he be assessed for an abnormal EKG.   1. Acute cystitis with hematuria    2. Irregular heartbeat      Patient received sulfamethoxazole-trimethoprim 400-80mg orally once before discharge.  Discharge Medication List as of 3/5/2019  7:12 PM      START taking these medications    Details   !! sulfamethoxazole-trimethoprim (BACTRIM) 400-80 MG Tab Take 1 Tab by mouth 2 times a day., Disp-20 Tab, R-0, Normal      !! sulfamethoxazole-trimethoprim (BACTRIM) 400-80 MG Tab Take 1 Tab by mouth 2 times a day., Disp-20 Tab, R-0, Print Rx Paper       !! - Potential duplicate medications found. Please discuss with provider.        Allergies: Patient has no known allergies.    Vitals:    03/05/19 1538 03/05/19 1700 03/05/19 1800 03/05/19 1906   BP:  114/64 133/65 132/61   Pulse:  (!) 58 (!) 59 (!) 57   Resp:  16 17 17   Temp:       TempSrc:       SpO2:  98% 94% 99%   Weight: 61.8 kg (136 lb 3.9 oz)          Final cultures:   Results     Procedure Component Value Units Date/Time    URINE CULTURE(NEW) [[id number]]  (Abnormal) Collected:  03/05/19 1657    Order Status:  Completed Specimen:  Urine Updated:  03/07/19 1126     Significant Indicator POS (POS)     Source UR     Site -     Urine Culture - (A)      Viridans Streptococcus  >100,000 cfu/mL   (A)    Narrative:       Indication for culture:->Emergency Room Patient          Plan:   Isolated organism is susceptible to prescribed therapy. However, the recommended treatment duration per IDSA guidelines is 3 days. Furthermore, the patient's potassium resulted at 5 and this medication can cause hyperkalemia. I called and spoke with the patient's niece and she stated that he was no longer experiencing symptoms. I discussed recommended duration per IDSA guidelines and he will stop taking the medication.     Za Carbajal, PharmD

## 2019-03-14 NOTE — DOCUMENTATION QUERY
Dear Dr. Cooper Maloney    Your help is needed in reviewing for additional diagnoses for the corresponding BNP Natriuretic Peptide which was ordered/performed for this encounter.  This services is non-covered by the diagnoses documented.Please see link for further information. The CMS website may ask you to agree with their policy with a button at the bottom of the page to view the resource.  https://www.cms.gov/medicare-coverage-database/details/lcd-details.aspx?[email or web address]=[email or web address]&     Please document any additional diagnoses evaluated for this patient on this encounter in the space provided, with respect to the following ________________.  Thank you in advance for your timely cooperation in this issue.  [facility] Coding Department  [facility]  Edit   [email or web address]

## 2019-03-18 ENCOUNTER — OFFICE VISIT (OUTPATIENT)
Dept: CARDIOLOGY | Facility: MEDICAL CENTER | Age: 84
End: 2019-03-18
Payer: MEDICARE

## 2019-03-18 VITALS
DIASTOLIC BLOOD PRESSURE: 60 MMHG | SYSTOLIC BLOOD PRESSURE: 110 MMHG | HEART RATE: 60 BPM | WEIGHT: 136 LBS | HEIGHT: 68 IN | OXYGEN SATURATION: 96 % | BODY MASS INDEX: 20.61 KG/M2

## 2019-03-18 DIAGNOSIS — N17.9 AKI (ACUTE KIDNEY INJURY) (HCC): ICD-10-CM

## 2019-03-18 DIAGNOSIS — R42 DIZZINESS: ICD-10-CM

## 2019-03-18 DIAGNOSIS — I44.4 LAFB (LEFT ANTERIOR FASCICULAR BLOCK): ICD-10-CM

## 2019-03-18 PROCEDURE — 99214 OFFICE O/P EST MOD 30 MIN: CPT | Performed by: INTERNAL MEDICINE

## 2019-03-18 ASSESSMENT — ENCOUNTER SYMPTOMS
LOSS OF CONSCIOUSNESS: 1
MEMORY LOSS: 1
DIZZINESS: 1
CARDIOVASCULAR NEGATIVE: 1
SEIZURES: 0

## 2019-03-18 NOTE — DOCUMENTATION QUERY
Dear Dr. Cooper Maloney    Your help is needed in reviewing for additional diagnoses for the corresponding BNP Natriuretic Peptide which was ordered/performed for this encounter.  This services is non-covered by the diagnoses documented.Please see link for further information. The CMS website may ask you to agree with their policy with a button at the bottom of the page to view the resource.  https://www.cms.gov/medicare-coverage-database/details/lcd-details.aspx?[email or web address]=[email or web address]&     Please document any additional diagnoses evaluated for this patient on this encounter in the space provided, with respect to the following ________________.  Thank you in advance for your timely cooperation in this issue.  [facility] Coding Department  [facility]  Edit   [email or web address]

## 2019-03-18 NOTE — PROGRESS NOTES
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    He is doing quite well today, and tells me about some dizziness that he has been having with somewhat increasing frequency over about the last month.  He had seen for a urinary tract infection with some acute kidney injury at an urgent care in the [facility] through the [facility] system when he was noted to have fluctuating heart rates.  No rhythm was clearly documented or sent over to be scanned into our system.  He was seen in the emergency room, and his EKG was essentially normal.  In the setting of his acute kidney injury he did have a potassium of 5.0.    He comes in with his daughter.  She tells me some anecdotes about being the  for a local orthopedic surgeon who I know socially and through patient care.    Past Medical History:   Diagnosis Date   • Cancer (HCC)     skin cancer      Past Surgical History:   Procedure Laterality Date   • HIP NAILING INTRAMEDULLARY Left 10/7/2015    Procedure: HIP NAILING INTRAMEDULLARY- Intertan ;  Surgeon: Sloan Sin M.D.;  Location: SURGERY [facility];  Service:    • OTHER ORTHOPEDIC SURGERY      left hip replacement     Family History   Problem Relation Age of Onset   • Stroke Father 67     History   Smoking Status   • Never Smoker   Smokeless Tobacco   • Never Used     No Known Allergies  Outpatient Encounter Prescriptions as of 3/18/2019   Medication Sig Dispense Refill   • aspirin EC (ECOTRIN) 81 MG Tablet Delayed Response Take 81 mg by mouth every day.     • ferrous sulfate 325 (65 Fe) MG tablet Take 325 mg by mouth every day.     • enalapril (VASOTEC) 10 MG Tab Take 5 mg by mouth 2 times a day.     • albuterol 108 (90 Base) MCG/ACT Aero Soln inhalation aerosol Inhale 1-2 Puffs by mouth every 6  "hours as needed for Shortness of Breath.     • [DISCONTINUED] sulfamethoxazole-trimethoprim (BACTRIM) 400-80 MG Tab Take 1 Tab by mouth 2 times a day. (Patient not taking: Reported on 3/18/2019) 20 Tab 0   • [DISCONTINUED] sulfamethoxazole-trimethoprim (BACTRIM) 400-80 MG Tab Take 1 Tab by mouth 2 times a day. (Patient not taking: Reported on 3/18/2019) 20 Tab 0     No facility-administered encounter medications on file as of 3/18/2019.      Review of Systems   Cardiovascular: Negative.    Musculoskeletal: Positive for joint pain.   Neurological: Positive for dizziness and loss of consciousness. Negative for seizures.        Vertigo   Psychiatric/Behavioral: Positive for memory loss.   All other systems reviewed and are negative.       Objective:   /60 (BP Location: Left arm, Patient Position: Sitting, BP Cuff Size: Adult)   Pulse 60   Ht 1.727 m (5' 8\")   Wt 61.7 kg (136 lb)   SpO2 96%   BMI 20.68 kg/m²     Physical Exam   Constitutional: He is oriented to person, place, and time. He appears well-developed and well-nourished. No distress.   Very pleasant elderly man accompanied by his daughter today in no distress.  Physical examination is unchanged compared to my previous on 4/28/2017 except where specified.   HENT:   Head: Normocephalic and atraumatic.   Eyes: Pupils are equal, round, and reactive to light. Conjunctivae and EOM are normal. No scleral icterus.   Neck: Neck supple. No JVD present. No tracheal deviation present.   Cardiovascular: Normal rate, regular rhythm, normal heart sounds and intact distal pulses.  Exam reveals no gallop and no friction rub.    No murmur heard.  Pulses:       Dorsalis pedis pulses are 2+ on the right side, and 2+ on the left side.   No carotid bruits   Pulmonary/Chest: Effort normal and breath sounds normal. No stridor. No respiratory distress. He has no wheezes. He has no rales.   Abdominal: Soft. Bowel sounds are normal. He exhibits no distension. " "  Musculoskeletal: He exhibits no edema.   Neurological: He is alert and oriented to person, place, and time.   Skin: Skin is warm and dry. No rash noted. He is not diaphoretic. No erythema. No pallor.   Psychiatric: He has a normal mood and affect. Judgment and thought content normal.   Vitals reviewed.    Holter monitor, 12/29/2016:  \"Summary: short runs of SVT, likely atrial tachycardia, not dangerous\"    Echocardiogram, 11/7/2016:  \"CONCLUSIONS  1. Left ventricular ejection fraction is visually estimated to be 65%.   Grade I diastolic dysfunction.  2. No valvular heart disease.  3. Estimated right ventricular systolic pressure  is 40 mmHg\"    Assessment:     1. LAFB (left anterior fascicular block)  Holter Monitor Study   2. Dizziness  Holter Monitor Study   3. URIEL (acute kidney injury) ([facility])  Basic Metabolic Panel       Medical Decision Making:  Today's Assessment / Status / Plan:     He is doing overall well the day though he is having episodes of dizziness in association with his conduction system disease and bradycardia.  I did order a repeat Holter monitor to assess that.  He did not have any carotid sensitivity on physical examination, and I do not see at this time any other cause for his dizziness with the exception of fluctuating blood pressures, which he has not been taking regularly and the possibility of progression of his conduction system disease.  Certainly, his elevated potassium may be playing a role in his bradycardia.  However, his potassium was elevated in the context of acute kidney injury from a urinary tract infection and likely has normalized.  I did order a nonfasting repeat chemistry panel in order to reevaluate.  If his potassium level is high and/or his renal function has not recovered to normal I would probably plan to change his enalapril to amlodipine.    Mauro Garza MD  Cardiologist, [facility] [place]     Return in about 6 months (around " 9/18/2019).      Physical Exam   Constitutional: He is oriented to person, place, and time. He appears well-developed and well-nourished. No distress.   Very pleasant elderly man accompanied by his daughter today in no distress.  Physical examination is unchanged compared to my previous on 4/28/2017 except where specified.   HENT:   Head: Normocephalic and atraumatic.   Eyes: Pupils are equal, round, and reactive to light. Conjunctivae and EOM are normal. No scleral icterus.   Neck: Neck supple. No JVD present. No tracheal deviation present.   Cardiovascular: Normal rate, regular rhythm, normal heart sounds and intact distal pulses.  Exam reveals no gallop and no friction rub.    No murmur heard.  Pulses:       Dorsalis pedis pulses are 2+ on the right side, and 2+ on the left side.   No carotid bruits   Pulmonary/Chest: Effort normal and breath sounds normal. No stridor. No respiratory distress. He has no wheezes. He has no rales.   Abdominal: Soft. Bowel sounds are normal. He exhibits no distension.   Musculoskeletal: He exhibits no edema.   Neurological: He is alert and oriented to person, place, and time.   Skin: Skin is warm and dry. No rash noted. He is not diaphoretic. No erythema. No pallor.   Psychiatric: He has a normal mood and affect. Judgment and thought content normal.   Vitals reviewed.

## 2019-03-18 NOTE — LETTER
Name:          Schwab, Frederick   YOB: 1929  Date:     03/18/2019      Gina West D.O.  [address]  [facility] [address]     Mauro Garza MD  [address], [address]  [place] [address]  Phone: [phone]  Back Line: [phone]  Fax: [phone]  E-mail: [email or web address]   Dear Dr. West,    We had the pleasure of seeing your patient, Frederick Schwab, in Cardiology Clinic at [facility] today.    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    He is doing overall well the day though he is having episodes of dizziness in association with his conduction system disease and bradycardia.  I did order a repeat Holter monitor to assess that.  He did not have any carotid sensitivity on physical examination, and I do not see at this time any other cause for his dizziness with the exception of fluctuating blood pressures, which he has not been taking regularly and the possibility of progression of his conduction system disease.  Certainly, his elevated potassium may be playing a role in his bradycardia.  However, his potassium was elevated in the context of acute kidney injury from a urinary tract infection and likely has normalized.  I did order a nonfasting repeat chemistry panel in order to reevaluate.  If his potassium level is high and/or his renal function has not recovered to normal I would probably plan to change his enalapril to amlodipine.    Return in about 6 months (around 9/18/2019).    Thank you for the referral and please do not hesitate to contact me at any time. My contact information is listed above.    This note was dictated using Dragon speech recognition software.     A full note including my physical examination and a full list of rectified  medications is available in our medical record, and can be faxed as well.    Mauro Garza MD  Cardiologist  [facility]

## 2019-03-28 ENCOUNTER — NON-PROVIDER VISIT (OUTPATIENT)
Dept: CARDIOLOGY | Facility: MEDICAL CENTER | Age: 84
End: 2019-03-28
Payer: MEDICARE

## 2019-03-28 DIAGNOSIS — I47.10 SVT (SUPRAVENTRICULAR TACHYCARDIA): ICD-10-CM

## 2019-03-28 PROCEDURE — 93224 XTRNL ECG REC UP TO 48 HRS: CPT | Performed by: INTERNAL MEDICINE

## 2019-04-01 ENCOUNTER — TELEPHONE (OUTPATIENT)
Dept: CARDIOLOGY | Facility: MEDICAL CENTER | Age: 84
End: 2019-04-01

## 2019-04-01 DIAGNOSIS — I44.7 LBBB (LEFT BUNDLE BRANCH BLOCK): ICD-10-CM

## 2019-04-01 DIAGNOSIS — R42 DIZZINESS: ICD-10-CM

## 2019-04-01 NOTE — TELEPHONE ENCOUNTER
Delmis Dean, Med Ass't  SRDIHAR Rodriguez!     When you get a chance I need an updated order for this patient. 48 hours for left anterior fasicular block and dizziness going to Mauro Garza.     Thanks!      Holter Monitor ordered

## 2019-04-09 ENCOUNTER — TELEPHONE (OUTPATIENT)
Dept: CARDIOLOGY | Facility: MEDICAL CENTER | Age: 84
End: 2019-04-09

## 2019-04-09 LAB — EKG IMPRESSION: NORMAL

## 2019-04-09 NOTE — TELEPHONE ENCOUNTER
Holter Monitor per Dr Garza:    Summary: short runs of SVT (supraventricular tachycardia), likely atrial   tachycardia, not dangerous     Electronically Signed On 4-9-2019 13:49:23 PDT by Mauro Garza MD     Called pt, s/w Layla (Vanessa), per Vanessa she's one of his POA, discussed HM result per Dr Garza, layla verbalizes understanding

## 2019-05-17 ENCOUNTER — OFFICE VISIT (OUTPATIENT)
Dept: URGENT CARE | Facility: CLINIC | Age: 84
End: 2019-05-17
Payer: MEDICARE

## 2019-05-17 VITALS
RESPIRATION RATE: 12 BRPM | DIASTOLIC BLOOD PRESSURE: 68 MMHG | SYSTOLIC BLOOD PRESSURE: 102 MMHG | BODY MASS INDEX: 22.76 KG/M2 | HEART RATE: 60 BPM | WEIGHT: 145 LBS | TEMPERATURE: 98.1 F | HEIGHT: 67 IN | OXYGEN SATURATION: 97 %

## 2019-05-17 DIAGNOSIS — R19.7 DIARRHEA, UNSPECIFIED TYPE: ICD-10-CM

## 2019-05-17 PROCEDURE — 99204 OFFICE O/P NEW MOD 45 MIN: CPT | Performed by: PHYSICIAN ASSISTANT

## 2019-05-17 ASSESSMENT — ENCOUNTER SYMPTOMS
NAUSEA: 0
ABDOMINAL PAIN: 1
FLATUS: 0
CHILLS: 0
DIARRHEA: 1
BLOOD IN STOOL: 0
WEIGHT LOSS: 0
HEADACHES: 0
COUGH: 0
SHORTNESS OF BREATH: 0
BLOATING: 0
SWEATS: 0
MYALGIAS: 0
PALPITATIONS: 0
FEVER: 0
VOMITING: 0
ARTHRALGIAS: 0

## 2019-05-17 ASSESSMENT — PAIN SCALES - GENERAL: PAINLEVEL: NO PAIN

## 2019-05-17 NOTE — PROGRESS NOTES
Subjective:      Frederick Louis Schwab is a 89 y.o. male who presents with Diarrhea (x2.5 weeks)            Diarrhea    This is a new problem. Episode onset: 2.5 weeks  The problem occurs 2 to 4 times per day (waking up every morning having had a loose bowel movement in bed). The stool consistency is described as watery. The patient states that diarrhea does not awaken him from sleep. Associated symptoms include abdominal pain (abdominal cramping prior to bowel movement ). Pertinent negatives include no arthralgias, bloating, chills, coughing, fever, headaches, increased  flatus, myalgias, sweats, URI, vomiting or weight loss. Risk factors: anitbiotic use in March. Nothing recent. He has tried anti-motility drug (Imodium) for the symptoms. The treatment provided no relief. There is no history of a recent abdominal surgery.     Patient is here with daughter. She states that he was given an antibiotic for his diarrhea last month but it did not help. He does reside at a senior care home. Daughter states that no other residents have had infectious diarrhea per nursing staff.    Past Medical History:   Diagnosis Date   • Cancer (HCC)     skin cancer        Past Surgical History:   Procedure Laterality Date   • HIP NAILING INTRAMEDULLARY Left 10/7/2015    Procedure: HIP NAILING INTRAMEDULLARY- Intertan ;  Surgeon: Sloan Sin M.D.;  Location: SURGERY [facility];  Service:    • OTHER ORTHOPEDIC SURGERY      left hip replacement       Family History   Problem Relation Age of Onset   • Stroke Father 67       No Known Allergies    Medications, Allergies, and current problem list reviewed today in Epic      Review of Systems   Constitutional: Negative for chills, fever, malaise/fatigue and weight loss.   Respiratory: Negative for cough and shortness of breath.    Cardiovascular: Negative for chest pain, palpitations and leg swelling.   Gastrointestinal: Positive for abdominal pain (abdominal cramping prior to bowel  "movement ) and diarrhea. Negative for bloating, blood in stool, flatus, nausea and vomiting.   Genitourinary: Negative for dysuria, frequency and urgency.   Musculoskeletal: Negative for arthralgias and myalgias.   Skin: Negative for rash.   Neurological: Negative for headaches.     All other systems reviewed and are negative.        Objective:     /68   Pulse 60   Temp 36.7 °C (98.1 °F) (Temporal)   Resp 12   Ht 1.702 m (5' 7\")   Wt 65.8 kg (145 lb)   SpO2 97%   BMI 22.71 kg/m²      Physical Exam   Constitutional: He is oriented to person, place, and time. He appears well-developed and well-nourished. No distress.   HENT:   Head: Normocephalic and atraumatic.   Eyes: Conjunctivae are normal.   Cardiovascular: Normal rate, regular rhythm and normal heart sounds.  Exam reveals no gallop and no friction rub.    No murmur heard.  Pulmonary/Chest: Effort normal and breath sounds normal. No respiratory distress. He has no wheezes. He has no rales.   Abdominal: Normal appearance and bowel sounds are normal. There is no tenderness. There is no rigidity, no rebound, no guarding, no CVA tenderness, no tenderness at McBurney's point and negative Rico's sign.   Neurological: He is alert and oriented to person, place, and time.   Skin: Skin is warm and dry. No rash noted.   Psychiatric: He has a normal mood and affect. His behavior is normal. Judgment and thought content normal.               Assessment/Plan:     1. Diarrhea, unspecified type    - push fluids. Start daily probiotics.    - CULTURE STOOL; Future  - C Diff by PCR rflx Toxin; Future  - Complete O&P; Future    Will obtain stool studies and change treatment plan accordingly.     Differential diagnoses, Supportive care, and indications for immediate follow-up discussed with patient.   Instructed to return to clinic or nearest emergency department for any change in condition, further concerns, or worsening of symptoms.      The patient demonstrated a " good understanding and agreed with the treatment plan.    Erika Taveras P.A.-C.

## 2019-05-19 ENCOUNTER — HOSPITAL ENCOUNTER (OUTPATIENT)
Facility: MEDICAL CENTER | Age: 84
End: 2019-05-19
Attending: PHYSICIAN ASSISTANT
Payer: MEDICARE

## 2019-05-19 PROCEDURE — 87046 STOOL CULTR AEROBIC BACT EA: CPT

## 2019-05-19 PROCEDURE — 87899 AGENT NOS ASSAY W/OPTIC: CPT

## 2019-05-19 PROCEDURE — 87329 GIARDIA AG IA: CPT

## 2019-05-19 PROCEDURE — 87328 CRYPTOSPORIDIUM AG IA: CPT

## 2019-05-19 PROCEDURE — 87045 FECES CULTURE AEROBIC BACT: CPT

## 2019-05-19 PROCEDURE — 87493 C DIFF AMPLIFIED PROBE: CPT

## 2019-05-20 DIAGNOSIS — R19.7 DIARRHEA, UNSPECIFIED TYPE: ICD-10-CM

## 2019-05-20 LAB
C DIFF DNA SPEC QL NAA+PROBE: NEGATIVE
C DIFF TOX GENS STL QL NAA+PROBE: NEGATIVE
G LAMBLIA+C PARVUM AG STL QL RAPID: NORMAL
SIGNIFICANT IND 70042: NORMAL
SITE SITE: NORMAL
SOURCE SOURCE: NORMAL

## 2019-05-21 LAB
E COLI SXT1+2 STL IA: NORMAL
SIGNIFICANT IND 70042: NORMAL
SITE SITE: NORMAL
SOURCE SOURCE: NORMAL

## 2019-05-23 LAB
BACTERIA STL CULT: NORMAL
E COLI SXT1+2 STL IA: NORMAL
SIGNIFICANT IND 70042: NORMAL
SITE SITE: NORMAL
SOURCE SOURCE: NORMAL

## 2019-05-28 ENCOUNTER — TELEPHONE (OUTPATIENT)
Dept: URGENT CARE | Facility: MEDICAL CENTER | Age: 84
End: 2019-05-28

## 2019-05-28 NOTE — TELEPHONE ENCOUNTER
Left  VM for patient and patient's family that all stool studies came back negative. Advised to have patient follow-up with PCP or consider GI referral if diarrhea persists.     Erika Taveras P.A.-C.

## 2019-06-03 ENCOUNTER — HOSPITAL ENCOUNTER (EMERGENCY)
Facility: MEDICAL CENTER | Age: 84
End: 2019-06-03
Attending: EMERGENCY MEDICINE
Payer: MEDICARE

## 2019-06-03 ENCOUNTER — APPOINTMENT (OUTPATIENT)
Dept: RADIOLOGY | Facility: MEDICAL CENTER | Age: 84
End: 2019-06-03
Attending: EMERGENCY MEDICINE
Payer: MEDICARE

## 2019-06-03 VITALS
SYSTOLIC BLOOD PRESSURE: 128 MMHG | TEMPERATURE: 97.8 F | RESPIRATION RATE: 16 BRPM | OXYGEN SATURATION: 98 % | WEIGHT: 137.57 LBS | BODY MASS INDEX: 21.55 KG/M2 | HEART RATE: 63 BPM | DIASTOLIC BLOOD PRESSURE: 66 MMHG

## 2019-06-03 DIAGNOSIS — R42 VERTIGO: ICD-10-CM

## 2019-06-03 LAB
ALBUMIN SERPL BCP-MCNC: 4.2 G/DL (ref 3.2–4.9)
ALBUMIN/GLOB SERPL: 1.7 G/DL
ALP SERPL-CCNC: 93 U/L (ref 30–99)
ALT SERPL-CCNC: 12 U/L (ref 2–50)
ANION GAP SERPL CALC-SCNC: 7 MMOL/L (ref 0–11.9)
AST SERPL-CCNC: 19 U/L (ref 12–45)
BASOPHILS # BLD AUTO: 0.5 % (ref 0–1.8)
BASOPHILS # BLD: 0.03 K/UL (ref 0–0.12)
BILIRUB SERPL-MCNC: 0.8 MG/DL (ref 0.1–1.5)
BUN SERPL-MCNC: 27 MG/DL (ref 8–22)
CALCIUM SERPL-MCNC: 9.1 MG/DL (ref 8.5–10.5)
CHLORIDE SERPL-SCNC: 109 MMOL/L (ref 96–112)
CO2 SERPL-SCNC: 24 MMOL/L (ref 20–33)
CREAT SERPL-MCNC: 1.4 MG/DL (ref 0.5–1.4)
EOSINOPHIL # BLD AUTO: 0.09 K/UL (ref 0–0.51)
EOSINOPHIL NFR BLD: 1.4 % (ref 0–6.9)
ERYTHROCYTE [DISTWIDTH] IN BLOOD BY AUTOMATED COUNT: 51.8 FL (ref 35.9–50)
GLOBULIN SER CALC-MCNC: 2.5 G/DL (ref 1.9–3.5)
GLUCOSE SERPL-MCNC: 72 MG/DL (ref 65–99)
HCT VFR BLD AUTO: 39.5 % (ref 42–52)
HGB BLD-MCNC: 12.5 G/DL (ref 14–18)
IMM GRANULOCYTES # BLD AUTO: 0.02 K/UL (ref 0–0.11)
IMM GRANULOCYTES NFR BLD AUTO: 0.3 % (ref 0–0.9)
LYMPHOCYTES # BLD AUTO: 2.01 K/UL (ref 1–4.8)
LYMPHOCYTES NFR BLD: 30.5 % (ref 22–41)
MCH RBC QN AUTO: 31.1 PG (ref 27–33)
MCHC RBC AUTO-ENTMCNC: 31.6 G/DL (ref 33.7–35.3)
MCV RBC AUTO: 98.3 FL (ref 81.4–97.8)
MONOCYTES # BLD AUTO: 0.62 K/UL (ref 0–0.85)
MONOCYTES NFR BLD AUTO: 9.4 % (ref 0–13.4)
NEUTROPHILS # BLD AUTO: 3.82 K/UL (ref 1.82–7.42)
NEUTROPHILS NFR BLD: 57.9 % (ref 44–72)
NRBC # BLD AUTO: 0 K/UL
NRBC BLD-RTO: 0 /100 WBC
PLATELET # BLD AUTO: 190 K/UL (ref 164–446)
PMV BLD AUTO: 10.4 FL (ref 9–12.9)
POTASSIUM SERPL-SCNC: 4.8 MMOL/L (ref 3.6–5.5)
PROT SERPL-MCNC: 6.7 G/DL (ref 6–8.2)
RBC # BLD AUTO: 4.02 M/UL (ref 4.7–6.1)
SODIUM SERPL-SCNC: 140 MMOL/L (ref 135–145)
TROPONIN I SERPL-MCNC: <0.01 NG/ML (ref 0–0.04)
WBC # BLD AUTO: 6.6 K/UL (ref 4.8–10.8)

## 2019-06-03 PROCEDURE — 85025 COMPLETE CBC W/AUTO DIFF WBC: CPT

## 2019-06-03 PROCEDURE — 84484 ASSAY OF TROPONIN QUANT: CPT

## 2019-06-03 PROCEDURE — 71045 X-RAY EXAM CHEST 1 VIEW: CPT

## 2019-06-03 PROCEDURE — 70450 CT HEAD/BRAIN W/O DYE: CPT

## 2019-06-03 PROCEDURE — 93005 ELECTROCARDIOGRAM TRACING: CPT | Performed by: EMERGENCY MEDICINE

## 2019-06-03 PROCEDURE — 99285 EMERGENCY DEPT VISIT HI MDM: CPT

## 2019-06-03 PROCEDURE — 80053 COMPREHEN METABOLIC PANEL: CPT

## 2019-06-03 RX ORDER — SODIUM CHLORIDE 9 MG/ML
1000 INJECTION, SOLUTION INTRAVENOUS ONCE
Status: DISCONTINUED | OUTPATIENT
Start: 2019-06-03 | End: 2019-06-03 | Stop reason: HOSPADM

## 2019-06-03 NOTE — ED NOTES
Pt arrived with niece. Pt states diarrhea x6 weeks, cultures negative. Pt lives at private apartment with nursing care, including PT. PT relays pt is experiencing, lighthead/dizzy, decreased mobility (gait shuffle), loss of balance, decreased cognition (memory), muted voice, and drooling x6 weeks. Niece states pt has not been drinking water regularly.     Pt is alert and oriented x3.

## 2019-06-03 NOTE — ED PROVIDER NOTES
ED Provider Note    CHIEF COMPLAINT  Chief Complaint   Patient presents with   • Lightheadedness   • Diarrhea       HPI  Frederick Louis Schwab is a 89 y.o. male with a history of chronic diarrhea for the past 6 weeks who presents with complaints of an episode of lightheadedness and dizziness since awakening this morning.  The patient says he felt like he was spinning around and was feeling off balance.  He said that was present for several hours but has since resolved.  The patient denied having any headache, chest pain, shortness of breath, back pain, or abdominal pain with this.  He has had no recent nausea or vomiting.  He has had problems with diarrhea for the past 6 weeks, and his cultures have been negative.  He says the diarrhea stopped yesterday and he has had no further diarrhea for the past day and a half.  He has been tolerating food and fluids.  The patient ate a bowl of cereal today.    REVIEW OF SYSTEMS  See HPI for further details. All other systems are negative.     PAST MEDICAL HISTORY  Past Medical History:   Diagnosis Date   • Cancer (HCC)     skin cancer        FAMILY HISTORY  Family History   Problem Relation Age of Onset   • Stroke Father 67       SOCIAL HISTORY  Social History     Social History   • Marital status: Single     Spouse name: N/A   • Number of children: N/A   • Years of education: N/A     Social History Main Topics   • Smoking status: Never Smoker   • Smokeless tobacco: Never Used   • Alcohol use No   • Drug use: No   • Sexual activity: Not on file     Other Topics Concern   • Not on file     Social History Narrative   • No narrative on file       SURGICAL HISTORY  Past Surgical History:   Procedure Laterality Date   • HIP NAILING INTRAMEDULLARY Left 10/7/2015    Procedure: HIP NAILING INTRAMEDULLARY- Intertan ;  Surgeon: Sloan Sin M.D.;  Location: SURGERY [facility];  Service:    • OTHER ORTHOPEDIC SURGERY      left hip replacement       CURRENT MEDICATIONS  Home  Medications    **Home medications have not yet been reviewed for this encounter**         ALLERGIES  No Known Allergies    PHYSICAL EXAM  VITAL SIGNS: /66   Pulse 63   Temp 36.6 °C (97.8 °F) (Temporal)   Resp 16   Wt 62.4 kg (137 lb 9.1 oz)   SpO2 99%   BMI 21.55 kg/m²   Constitutional: Awake, alert, in no acute distress, Non-toxic appearance.   HENT: Atraumatic. Bilateral external ears normal, mucous membranes dry, throat nonerythematous without exudates, nose is normal.  Eyes: PERRL, EOMI, conjunctiva moist, noninjected.  No nystagmus or photophobia.  Neck: Nontender, Normal range of motion, No nuchal rigidity, No stridor.   Lymphatic: No lymphadenopathy noted.   Cardiovascular: Regular rate and rhythm, no murmurs, rubs, gallops.  Thorax & Lungs:  Good breath sounds bilaterally, no wheezes, rales, or retractions.  No chest tenderness.  Abdomen: Bowel sounds normal, Soft, nontender, nondistended, no rebound, guarding, masses.  Back: No CVA or spinal tenderness.  Extremities: Intact distal pulses, No edema, No tenderness.   Skin: Warm, Dry, No rashes.   Musculoskeletal: No joint swelling or tenderness.  Neurologic: Alert & oriented x 3, cranial nerves II through XII intact, sensory intact light touch, and motor function normal with 5/5 strength to upper and lower extremities, no pronator drift, alternating finger movements are intact. No focal deficits.   Psychiatric: Affect normal, Judgment normal, Mood normal.     EKG  Twelve-lead EKG shows sinus bradycardia, rate 59, normal intervals, left axis deviation, there is a left anterior fascicular block, QRS mildly widened at 92 ms, no acute ST wave elevations or ST depressions, no pathologic Q waves, inverted T wave in lead III, flattening in aVF, no evidence of an acute injury or ischemic pattern on my reading, in comparison to previous EKG from March, 2019, there are no acute changes.        RADIOLOGY/PROCEDURES  DX-CHEST-PORTABLE (1 VIEW)   Final Result          1. No acute cardiopulmonary abnormalities are identified.      CT-HEAD W/O   Final Result         1. No acute intracranial hemorrhage.      2. Cerebral atrophy.      3. Similar enlarged lateral ventricles               COURSE & MEDICAL DECISION MAKING  Pertinent Labs & Imaging studies reviewed. (See chart for details)  The patient presents with the above complaints.  He appears to have had an episode of vertigo which has since resolved.  He is currently asymptomatic.  Clinically he appears dehydrated given his history of diarrhea along with dry mucous membranes.  IV was placed, he was given a bolus of normal saline and Zofran.    HYDRATION: Based on the patient's presentation of Acute Diarrhea and Dehydration the patient was given IV fluids. IV Hydration was used because oral hydration was not adequate alone. Upon recheck following hydration, the patient was feeling improved.  He was able to tolerate oral fluids.     CBC showed a white count 6600, hemoglobin 12.5, normal differential, chemistry shows a BUN of 27, creatinine 1.4, GFR 48, troponin less than 0.01.  CT scan head without contrast shows no acute intracranial findings, some atrophy, enlarged ventricles were noted which are chronic findings.  On reexamination, the patient remains asymptomatic, with no focal deficits.  He did complain of having diarrhea for the past 6 weeks, but has had no episodes here in the emergency department.  His lab work shows a elevated BUN and creatinine above baseline which I think is secondary to dehydration. He was hydrated with IV fluids.  I suspect he had an episode of benign positional vertigo.  Findings were discussed with the patient and family.  Patient will be discharged with instructions to return to the ER for any worsening symptoms, fever, vomiting, numbness or weakness, or any other problems.      FINAL IMPRESSION  1.  Acute vertigo  2.   3.         Electronically signed by: Mannie Romero, 6/3/2019 1:09 PM

## 2019-06-03 NOTE — ED TRIAGE NOTES
Pt ambulatory to triage. Pt has had diarrhea approx 6 weeks, already seen at MD office and had negative stool cultures, pt states no diarrhea today or yesterday. [facility] lives at [facility] and PT has noticed over the last 6 weeks pt has had gait changes, and cognitive issues. Pt denies abdominal pain.

## 2019-06-08 ENCOUNTER — ANESTHESIA EVENT (OUTPATIENT)
Dept: SURGERY | Facility: MEDICAL CENTER | Age: 84
DRG: 956 | End: 2019-06-08
Payer: MEDICARE

## 2019-06-08 ENCOUNTER — APPOINTMENT (OUTPATIENT)
Dept: RADIOLOGY | Facility: MEDICAL CENTER | Age: 84
DRG: 956 | End: 2019-06-08
Attending: EMERGENCY MEDICINE
Payer: MEDICARE

## 2019-06-08 ENCOUNTER — HOSPITAL ENCOUNTER (INPATIENT)
Facility: MEDICAL CENTER | Age: 84
LOS: 4 days | DRG: 956 | End: 2019-06-12
Attending: EMERGENCY MEDICINE | Admitting: HOSPITALIST
Payer: MEDICARE

## 2019-06-08 ENCOUNTER — ANESTHESIA (OUTPATIENT)
Dept: SURGERY | Facility: MEDICAL CENTER | Age: 84
DRG: 956 | End: 2019-06-08
Payer: MEDICARE

## 2019-06-08 ENCOUNTER — APPOINTMENT (OUTPATIENT)
Dept: RADIOLOGY | Facility: MEDICAL CENTER | Age: 84
DRG: 956 | End: 2019-06-08
Attending: ORTHOPAEDIC SURGERY
Payer: MEDICARE

## 2019-06-08 DIAGNOSIS — S72.001A CLOSED FRACTURE DISLOCATION OF RIGHT HIP JOINT, INITIAL ENCOUNTER (HCC): ICD-10-CM

## 2019-06-08 DIAGNOSIS — S72.001K CLOSED FRACTURE OF RIGHT HIP WITH NONUNION: ICD-10-CM

## 2019-06-08 DIAGNOSIS — R40.4 TRANSIENT ALTERATION OF AWARENESS: ICD-10-CM
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
## 2019-06-08 PROBLEM — T79.6XXA TRAUMATIC RHABDOMYOLYSIS (HCC): Status: ACTIVE | Noted: 2019-06-08

## 2019-06-08 PROBLEM — E86.0 DEHYDRATION: Status: ACTIVE | Noted: 2019-06-08

## 2019-06-08 PROBLEM — Z66 DNR (DO NOT RESUSCITATE): Status: ACTIVE | Noted: 2019-06-08

## 2019-06-08 PROBLEM — F03.90 DEMENTIA (HCC): Status: ACTIVE | Noted: 2019-06-08

## 2019-06-08 LAB
ALBUMIN SERPL BCP-MCNC: 4.1 G/DL (ref 3.2–4.9)
ALBUMIN/GLOB SERPL: 1.7 G/DL
ALP SERPL-CCNC: 94 U/L (ref 30–99)
ALT SERPL-CCNC: 17 U/L (ref 2–50)
ANION GAP SERPL CALC-SCNC: 11 MMOL/L (ref 0–11.9)
APPEARANCE UR: CLEAR
AST SERPL-CCNC: 28 U/L (ref 12–45)
BASOPHILS # BLD AUTO: 0.2 % (ref 0–1.8)
BASOPHILS # BLD: 0.02 K/UL (ref 0–0.12)
BILIRUB SERPL-MCNC: 1.1 MG/DL (ref 0.1–1.5)
BILIRUB UR QL STRIP.AUTO: NEGATIVE
BUN SERPL-MCNC: 26 MG/DL (ref 8–22)
CALCIUM SERPL-MCNC: 9.1 MG/DL (ref 8.5–10.5)
CHLORIDE SERPL-SCNC: 110 MMOL/L (ref 96–112)
CK SERPL-CCNC: 595 U/L (ref 0–154)
CO2 SERPL-SCNC: 20 MMOL/L (ref 20–33)
COLOR UR: YELLOW
CREAT SERPL-MCNC: 1.1 MG/DL (ref 0.5–1.4)
EKG IMPRESSION: NORMAL
EKG IMPRESSION: NORMAL
EOSINOPHIL # BLD AUTO: 0 K/UL (ref 0–0.51)
EOSINOPHIL NFR BLD: 0 % (ref 0–6.9)
ERYTHROCYTE [DISTWIDTH] IN BLOOD BY AUTOMATED COUNT: 49.8 FL (ref 35.9–50)
GLOBULIN SER CALC-MCNC: 2.4 G/DL (ref 1.9–3.5)
GLUCOSE SERPL-MCNC: 131 MG/DL (ref 65–99)
GLUCOSE UR STRIP.AUTO-MCNC: NEGATIVE MG/DL
HCT VFR BLD AUTO: 33.7 % (ref 42–52)
HGB BLD-MCNC: 10.9 G/DL (ref 14–18)
IMM GRANULOCYTES # BLD AUTO: 0.02 K/UL (ref 0–0.11)
IMM GRANULOCYTES NFR BLD AUTO: 0.2 % (ref 0–0.9)
KETONES UR STRIP.AUTO-MCNC: NEGATIVE MG/DL
LACTATE BLD-SCNC: 1.4 MMOL/L (ref 0.5–2)
LACTATE BLD-SCNC: 2.2 MMOL/L (ref 0.5–2)
LEUKOCYTE ESTERASE UR QL STRIP.AUTO: NEGATIVE
LYMPHOCYTES # BLD AUTO: 0.72 K/UL (ref 1–4.8)
LYMPHOCYTES NFR BLD: 6.3 % (ref 22–41)
MCH RBC QN AUTO: 31.1 PG (ref 27–33)
MCHC RBC AUTO-ENTMCNC: 32.3 G/DL (ref 33.7–35.3)
MCV RBC AUTO: 96.3 FL (ref 81.4–97.8)
MICRO URNS: NORMAL
MONOCYTES # BLD AUTO: 1.21 K/UL (ref 0–0.85)
MONOCYTES NFR BLD AUTO: 10.6 % (ref 0–13.4)
NEUTROPHILS # BLD AUTO: 9.44 K/UL (ref 1.82–7.42)
NEUTROPHILS NFR BLD: 82.7 % (ref 44–72)
NITRITE UR QL STRIP.AUTO: NEGATIVE
NRBC # BLD AUTO: 0 K/UL
NRBC BLD-RTO: 0 /100 WBC
PH UR STRIP.AUTO: 5 [PH]
PLATELET # BLD AUTO: 171 K/UL (ref 164–446)
PMV BLD AUTO: 10.3 FL (ref 9–12.9)
POTASSIUM SERPL-SCNC: 4.5 MMOL/L (ref 3.6–5.5)
PROT SERPL-MCNC: 6.5 G/DL (ref 6–8.2)
PROT UR QL STRIP: NEGATIVE MG/DL
RBC # BLD AUTO: 3.5 M/UL (ref 4.7–6.1)
RBC UR QL AUTO: NEGATIVE
SODIUM SERPL-SCNC: 141 MMOL/L (ref 135–145)
SP GR UR STRIP.AUTO: 1.02
TROPONIN I SERPL-MCNC: <0.01 NG/ML (ref 0–0.04)
UROBILINOGEN UR STRIP.AUTO-MCNC: 0.2 MG/DL
WBC # BLD AUTO: 11.4 K/UL (ref 4.8–10.8)

## 2019-06-08 PROCEDURE — 87040 BLOOD CULTURE FOR BACTERIA: CPT | Mod: 91

## 2019-06-08 PROCEDURE — 700105 HCHG RX REV CODE 258: Performed by: EMERGENCY MEDICINE

## 2019-06-08 PROCEDURE — C1713 ANCHOR/SCREW BN/BN,TIS/BN: HCPCS | Performed by: ORTHOPAEDIC SURGERY

## 2019-06-08 PROCEDURE — 501838 HCHG SUTURE GENERAL: Performed by: ORTHOPAEDIC SURGERY

## 2019-06-08 PROCEDURE — 700101 HCHG RX REV CODE 250: Performed by: ANESTHESIOLOGY

## 2019-06-08 PROCEDURE — 700105 HCHG RX REV CODE 258: Performed by: HOSPITALIST

## 2019-06-08 PROCEDURE — A6402 STERILE GAUZE <= 16 SQ IN: HCPCS | Performed by: ORTHOPAEDIC SURGERY

## 2019-06-08 PROCEDURE — 99285 EMERGENCY DEPT VISIT HI MDM: CPT

## 2019-06-08 PROCEDURE — 85025 COMPLETE CBC W/AUTO DIFF WBC: CPT

## 2019-06-08 PROCEDURE — 71045 X-RAY EXAM CHEST 1 VIEW: CPT

## 2019-06-08 PROCEDURE — 82550 ASSAY OF CK (CPK): CPT

## 2019-06-08 PROCEDURE — 96374 THER/PROPH/DIAG INJ IV PUSH: CPT

## 2019-06-08 PROCEDURE — 84484 ASSAY OF TROPONIN QUANT: CPT

## 2019-06-08 PROCEDURE — 500891 HCHG PACK, ORTHO MAJOR: Performed by: ORTHOPAEDIC SURGERY

## 2019-06-08 PROCEDURE — 160035 HCHG PACU - 1ST 60 MINS PHASE I: Performed by: ORTHOPAEDIC SURGERY

## 2019-06-08 PROCEDURE — 160041 HCHG SURGERY MINUTES - EA ADDL 1 MIN LEVEL 4: Performed by: ORTHOPAEDIC SURGERY

## 2019-06-08 PROCEDURE — 80053 COMPREHEN METABOLIC PANEL: CPT

## 2019-06-08 PROCEDURE — 36415 COLL VENOUS BLD VENIPUNCTURE: CPT

## 2019-06-08 PROCEDURE — 700111 HCHG RX REV CODE 636 W/ 250 OVERRIDE (IP): Performed by: EMERGENCY MEDICINE

## 2019-06-08 PROCEDURE — 160009 HCHG ANES TIME/MIN: Performed by: ORTHOPAEDIC SURGERY

## 2019-06-08 PROCEDURE — 160029 HCHG SURGERY MINUTES - 1ST 30 MINS LEVEL 4: Performed by: ORTHOPAEDIC SURGERY

## 2019-06-08 PROCEDURE — 93005 ELECTROCARDIOGRAM TRACING: CPT | Performed by: EMERGENCY MEDICINE

## 2019-06-08 PROCEDURE — 160048 HCHG OR STATISTICAL LEVEL 1-5: Performed by: ORTHOPAEDIC SURGERY

## 2019-06-08 PROCEDURE — 81003 URINALYSIS AUTO W/O SCOPE: CPT

## 2019-06-08 PROCEDURE — 160002 HCHG RECOVERY MINUTES (STAT): Performed by: ORTHOPAEDIC SURGERY

## 2019-06-08 PROCEDURE — A9270 NON-COVERED ITEM OR SERVICE: HCPCS | Performed by: HOSPITALIST

## 2019-06-08 PROCEDURE — 96375 TX/PRO/DX INJ NEW DRUG ADDON: CPT

## 2019-06-08 PROCEDURE — 73552 X-RAY EXAM OF FEMUR 2/>: CPT | Mod: RT

## 2019-06-08 PROCEDURE — 0QS636Z REPOSITION RIGHT UPPER FEMUR WITH INTRAMEDULLARY INTERNAL FIXATION DEVICE, PERCUTANEOUS APPROACH: ICD-10-PCS | Performed by: ORTHOPAEDIC SURGERY

## 2019-06-08 PROCEDURE — 700105 HCHG RX REV CODE 258: Performed by: ANESTHESIOLOGY

## 2019-06-08 PROCEDURE — 83605 ASSAY OF LACTIC ACID: CPT

## 2019-06-08 PROCEDURE — 700111 HCHG RX REV CODE 636 W/ 250 OVERRIDE (IP): Performed by: ANESTHESIOLOGY

## 2019-06-08 PROCEDURE — 700102 HCHG RX REV CODE 250 W/ 637 OVERRIDE(OP): Performed by: HOSPITALIST

## 2019-06-08 PROCEDURE — 72170 X-RAY EXAM OF PELVIS: CPT

## 2019-06-08 PROCEDURE — 160036 HCHG PACU - EA ADDL 30 MINS PHASE I: Performed by: ORTHOPAEDIC SURGERY

## 2019-06-08 PROCEDURE — 770001 HCHG ROOM/CARE - MED/SURG/GYN PRIV*

## 2019-06-08 PROCEDURE — 99223 1ST HOSP IP/OBS HIGH 75: CPT | Mod: AI | Performed by: HOSPITALIST

## 2019-06-08 DEVICE — NAIL HIP 127.5 DEGREE 10MM X 180MM (4TX2=8): Type: IMPLANTABLE DEVICE | Site: HIP | Status: FUNCTIONAL

## 2019-06-08 DEVICE — SCREW CROSS LOCK 5MM X 35MM (4TX5=20): Type: IMPLANTABLE DEVICE | Site: HIP | Status: FUNCTIONAL

## 2019-06-08 DEVICE — SCREW LAG 10.5MM X 100MM (4TX2=8): Type: IMPLANTABLE DEVICE | Site: HIP | Status: FUNCTIONAL

## 2019-06-08 RX ORDER — MORPHINE SULFATE 4 MG/ML
1-4 INJECTION, SOLUTION INTRAMUSCULAR; INTRAVENOUS
Status: DISCONTINUED | OUTPATIENT
Start: 2019-06-08 | End: 2019-06-12 | Stop reason: HOSPADM

## 2019-06-08 RX ORDER — ONDANSETRON 2 MG/ML
4 INJECTION INTRAMUSCULAR; INTRAVENOUS
Status: DISCONTINUED | OUTPATIENT
Start: 2019-06-08 | End: 2019-06-08 | Stop reason: HOSPADM

## 2019-06-08 RX ORDER — ONDANSETRON 2 MG/ML
4 INJECTION INTRAMUSCULAR; INTRAVENOUS ONCE
Status: COMPLETED | OUTPATIENT
Start: 2019-06-08 | End: 2019-06-08

## 2019-06-08 RX ORDER — BISACODYL 10 MG
10 SUPPOSITORY, RECTAL RECTAL
Status: DISCONTINUED | OUTPATIENT
Start: 2019-06-08 | End: 2019-06-12 | Stop reason: HOSPADM

## 2019-06-08 RX ORDER — OXYCODONE HCL 5 MG/5 ML
5 SOLUTION, ORAL ORAL
Status: DISCONTINUED | OUTPATIENT
Start: 2019-06-08 | End: 2019-06-08 | Stop reason: HOSPADM

## 2019-06-08 RX ORDER — CEFAZOLIN SODIUM 1 G/3ML
INJECTION, POWDER, FOR SOLUTION INTRAMUSCULAR; INTRAVENOUS PRN
Status: DISCONTINUED | OUTPATIENT
Start: 2019-06-08 | End: 2019-06-08 | Stop reason: SURG

## 2019-06-08 RX ORDER — POLYETHYLENE GLYCOL 3350 17 G/17G
1 POWDER, FOR SOLUTION ORAL
Status: DISCONTINUED | OUTPATIENT
Start: 2019-06-08 | End: 2019-06-12 | Stop reason: HOSPADM

## 2019-06-08 RX ORDER — ACETAMINOPHEN 325 MG/1
650 TABLET ORAL EVERY 6 HOURS PRN
Status: DISCONTINUED | OUTPATIENT
Start: 2019-06-08 | End: 2019-06-09

## 2019-06-08 RX ORDER — AMOXICILLIN 250 MG
2 CAPSULE ORAL 2 TIMES DAILY
Status: DISCONTINUED | OUTPATIENT
Start: 2019-06-08 | End: 2019-06-12 | Stop reason: HOSPADM

## 2019-06-08 RX ORDER — OXYCODONE HYDROCHLORIDE 5 MG/1
5 TABLET ORAL EVERY 4 HOURS PRN
Status: DISCONTINUED | OUTPATIENT
Start: 2019-06-08 | End: 2019-06-12 | Stop reason: HOSPADM

## 2019-06-08 RX ORDER — SODIUM CHLORIDE, SODIUM LACTATE, POTASSIUM CHLORIDE, CALCIUM CHLORIDE 600; 310; 30; 20 MG/100ML; MG/100ML; MG/100ML; MG/100ML
INJECTION, SOLUTION INTRAVENOUS CONTINUOUS
Status: DISCONTINUED | OUTPATIENT
Start: 2019-06-08 | End: 2019-06-10

## 2019-06-08 RX ORDER — SODIUM CHLORIDE, SODIUM LACTATE, POTASSIUM CHLORIDE, CALCIUM CHLORIDE 600; 310; 30; 20 MG/100ML; MG/100ML; MG/100ML; MG/100ML
INJECTION, SOLUTION INTRAVENOUS
Status: DISCONTINUED | OUTPATIENT
Start: 2019-06-08 | End: 2019-06-08 | Stop reason: SURG

## 2019-06-08 RX ORDER — MORPHINE SULFATE 4 MG/ML
4 INJECTION, SOLUTION INTRAMUSCULAR; INTRAVENOUS ONCE
Status: COMPLETED | OUTPATIENT
Start: 2019-06-08 | End: 2019-06-08

## 2019-06-08 RX ORDER — SODIUM CHLORIDE 9 MG/ML
1000 INJECTION, SOLUTION INTRAVENOUS CONTINUOUS
Status: ACTIVE | OUTPATIENT
Start: 2019-06-08 | End: 2019-06-08

## 2019-06-08 RX ORDER — OXYCODONE HCL 5 MG/5 ML
10 SOLUTION, ORAL ORAL
Status: DISCONTINUED | OUTPATIENT
Start: 2019-06-08 | End: 2019-06-08 | Stop reason: HOSPADM

## 2019-06-08 RX ORDER — HALOPERIDOL 5 MG/ML
1 INJECTION INTRAMUSCULAR
Status: DISCONTINUED | OUTPATIENT
Start: 2019-06-08 | End: 2019-06-08 | Stop reason: HOSPADM

## 2019-06-08 RX ORDER — BUPIVACAINE HYDROCHLORIDE AND EPINEPHRINE 5; 5 MG/ML; UG/ML
INJECTION, SOLUTION PERINEURAL
Status: DISCONTINUED | OUTPATIENT
Start: 2019-06-08 | End: 2019-06-08 | Stop reason: HOSPADM

## 2019-06-08 RX ORDER — ONDANSETRON 2 MG/ML
INJECTION INTRAMUSCULAR; INTRAVENOUS PRN
Status: DISCONTINUED | OUTPATIENT
Start: 2019-06-08 | End: 2019-06-08 | Stop reason: SURG

## 2019-06-08 RX ADMIN — PROPOFOL 80 MG: 10 INJECTION, EMULSION INTRAVENOUS at 17:05

## 2019-06-08 RX ADMIN — FENTANYL CITRATE 50 MCG: 50 INJECTION, SOLUTION INTRAMUSCULAR; INTRAVENOUS at 17:40

## 2019-06-08 RX ADMIN — FENTANYL CITRATE 50 MCG: 50 INJECTION, SOLUTION INTRAMUSCULAR; INTRAVENOUS at 17:41

## 2019-06-08 RX ADMIN — ONDANSETRON 4 MG: 2 INJECTION INTRAMUSCULAR; INTRAVENOUS at 17:41

## 2019-06-08 RX ADMIN — LIDOCAINE HYDROCHLORIDE 50 MG: 20 INJECTION, SOLUTION INTRAVENOUS at 17:05

## 2019-06-08 RX ADMIN — SODIUM CHLORIDE, POTASSIUM CHLORIDE, SODIUM LACTATE AND CALCIUM CHLORIDE: 600; 310; 30; 20 INJECTION, SOLUTION INTRAVENOUS at 21:32

## 2019-06-08 RX ADMIN — EPHEDRINE SULFATE 25 MG: 50 INJECTION INTRAMUSCULAR; INTRAVENOUS; SUBCUTANEOUS at 17:15

## 2019-06-08 RX ADMIN — ACETAMINOPHEN 650 MG: 325 TABLET, FILM COATED ORAL at 23:41

## 2019-06-08 RX ADMIN — SODIUM CHLORIDE, POTASSIUM CHLORIDE, SODIUM LACTATE AND CALCIUM CHLORIDE: 600; 310; 30; 20 INJECTION, SOLUTION INTRAVENOUS at 16:58

## 2019-06-08 RX ADMIN — SODIUM CHLORIDE 1000 ML: 9 INJECTION, SOLUTION INTRAVENOUS at 12:43

## 2019-06-08 RX ADMIN — CEFAZOLIN 1 G: 330 INJECTION, POWDER, FOR SOLUTION INTRAMUSCULAR; INTRAVENOUS at 17:05

## 2019-06-08 RX ADMIN — ONDANSETRON 4 MG: 2 INJECTION INTRAMUSCULAR; INTRAVENOUS at 12:48

## 2019-06-08 RX ADMIN — MORPHINE SULFATE 4 MG: 4 INJECTION INTRAVENOUS at 12:48

## 2019-06-08 RX ADMIN — SODIUM CHLORIDE 1000 ML: 9 INJECTION, SOLUTION INTRAVENOUS at 10:51

## 2019-06-08 ASSESSMENT — LIFESTYLE VARIABLES
EVER_SMOKED: NEVER
DO YOU DRINK ALCOHOL: NO

## 2019-06-08 NOTE — ED TRIAGE NOTES
Chief Complaint   Patient presents with   • Fall     found down this morning   • Leg Pain     right upper thigh     Pt bib ems, per report pt was found by family memeber this morning laying floor, pt lives in assisted living and has dementia. When I asked pt what happened, he said that he was trying to go to roof and get his white hat.  Pt c/o right upper leg, denies head or other pain.

## 2019-06-08 NOTE — ED PROVIDER NOTES
Means of arrival: Ambulance  History obtained from: Patient and Niece      CHIEF COMPLAINT  • Fall  • Extremity Pain    HPI  Frederick Louis Schwab is an 89 y.o. male with a history of dementia who presents to the ED by ambulance for a fall and extremity pain, onset this morning. The patient lives at [facility] assisted living facility. Per niece, he was found down on his right side for an unknown length of time. He was incontinent of his urine. She is concerned the patient fell yesterday as he was wearing the same clothes from then. Unknown loss of consciousness. Likely head injury as he has a new abrasion to his temple. Patient is unable to recall the events of the fall. He complains of right lower extremity pain to his hip and right anterior thigh. Pain is constant and exacerbated with movement. He did not attempt to bear weight upon being lifted off the ground. He is typically able to ambulate but does not utilize a walker. Negative for headache, neck pain, back pain, chest pain, abdominal pain, focal weakness or numbness. Surgical history reviewed and includes a left hip replacement and intramedullary hip nailing in 10/2015. Recent ED visit for diarrhea from dehydration. Per family member, patient refuses to drink water.       REVIEW OF SYSTEMS  See HPI for further details. All other systems are negative. C.      PAST MEDICAL HISTORY  Past Medical History:   Diagnosis Date   • Cancer (HCC)     skin cancer        FAMILY HISTORY  Family History   Problem Relation Age of Onset   • Stroke Father 67       SOCIAL HISTORY  Social History     Social History   • Marital status: Single     Social History Main Topics   • Smoking status: Never Smoker   • Smokeless tobacco: Never Used   • Alcohol use No   • Drug use: No   Accompanied by niece. Patient lives in an assisted living center.      SURGICAL HISTORY  Past Surgical History:   Procedure Laterality Date   • HIP NAILING INTRAMEDULLARY Left 10/7/2015     "Procedure: HIP NAILING INTRAMEDULLARY- Intertan ;  Surgeon: Sloan Sin M.D.;  Location: SURGERY [facility];  Service:    • OTHER ORTHOPEDIC SURGERY      left hip replacement       CURRENT MEDICATIONS  Home Medications     Reviewed by Danica Heart on 06/08/19 at 1525  Med List Status: Complete   Medication Last Dose Status   acetaminophen (TYLENOL) tablet 650 mg  Active   aspirin EC (ECOTRIN) 81 MG Tablet Delayed Response 6/3/2019 Active   bisacodyl (DULCOLAX) suppository 10 mg  Active   enalapril (VASOTEC) 10 MG Tab 3 DAYS Active   ferrous sulfate 325 (65 Fe) MG tablet 3 DAYS Active   lactated ringers infusion  Active   magnesium hydroxide (MILK OF MAGNESIA) suspension 30 mL  Active   morphine (pf) 4 mg/ml injection 1-4 mg  Active   oxyCODONE immediate-release (ROXICODONE) tablet 5 mg  Active   polyethylene glycol/lytes (MIRALAX) PACKET 1 Packet  Active   senna-docusate (PERICOLACE or SENOKOT S) 8.6-50 MG per tablet 2 Tab  Active                ALLERGIES  None     PHYSICAL EXAM  VITAL SIGNS: /47   Pulse 69   Temp 36.2 °C (97.2 °F) (Oral)   Resp 20   Ht 1.651 m (5' 5\")   Wt 62.1 kg (137 lb)   SpO2 93%   BMI 22.80 kg/m²     Constitutional: No acute distress, Non-toxic appearance.   HENT: Normocephalic, Bilateral external ears normal, Oropharynx: dry mucous membranes, No oral exudates, Nose normal.   Eyes: PERRLA, EOMI, Conjunctiva normal, Pupil 4 mm, clear-yellow drainage bilaterally greater in right eye.  Neck: Normal range of motion, No tenderness, Supple, No stridor. No tenderness with palpation over vertebral bodies  Lymphatic: No lymphadenopathy noted to palpation or tenderness.  Cardiovascular: Normal heart rate, Normal rhythm, No murmurs, No rubs, No gallops.   Thorax & Lungs: Normal breath sounds, No respiratory distress, No wheezing, No chest tenderness. No rhonchi or rales.  Abdomen: Bowel sounds normal, Soft, No tenderness, No masses, No pulsatile masses. No peritoneal sign. No " guarding or rebound tenderness to palpation.  Skin: Purpura 1.5 cm on right temporal region and right arm 5 cm. Dry skin to bilateral lower extremities Warm, Dry, No erythema, No rash.   Back: No tenderness, No CVA tenderness. Full range of motion. No skin breakdown.  Genitourinary: Incontinent of urine. No skin break down.  Extremities: Left lower extremity is nontender. Tenderness to right mid-lower shaft of femur, right leg limited ROM. Tenderness to right hip. Shortening and external rotation of right lower extremity. Intact distal pulses, No edema, No cyanosis, No clubbing. No calf tenderness. Capillary refill is less than 3 second to upper and lower extremities.  Neurologic: Central tremor of both hands with some cogwheel rigidity, mostly on right.    Alert and oriented only to person and place. Is not oriented to month or day of week. Normal motor function, Normal sensory function, No focal deficits noted. No meningeal signs. Negative Kernig's, Brudzinski. Cranial nerves II through XII grossly intact. Hand  is 5+/5+ bilateral and symmetrical. No lateralizing signs or neuro deficits noted on exam.  Psychiatric: Affect normal, Judgment normal, Mood normal.        EKG  Sinus tachycardia, rate 125.  No ST elevation or depression.  QRS normal with no widening.  GA intervals normal.  Poor R wave progression.  No diagnostic T waves.  No previous EKG for comparison.  Interpretation: normal EKG.      RADIOLOGY/PROCEDURES  DX-CHEST-PORTABLE (1 VIEW)   Final Result      No acute cardiopulmonary abnormality identified.      DX-PELVIS-1 OR 2 VIEWS   Final Result      Acute intertrochanteric fracture of the right femur with mild varus angulation.      DX-FEMUR-2+ RIGHT   Final Result      Right intertrochanteric fracture with angulation      DX-FEMUR-2+ RIGHT    (Results Pending)   DX-PORTABLE FLUORO > 1 HOUR    (Results Pending)      All radiology interpreted by the radiologist and all the readings reviewed by me.      Results for orders placed or performed during the hospital encounter of 06/08/19   CBC WITH DIFFERENTIAL   Result Value Ref Range    WBC 11.4 (H) 4.8 - 10.8 K/uL    RBC 3.50 (L) 4.70 - 6.10 M/uL    Hemoglobin 10.9 (L) 14.0 - 18.0 g/dL    Hematocrit 33.7 (L) 42.0 - 52.0 %    MCV 96.3 81.4 - 97.8 fL    MCH 31.1 27.0 - 33.0 pg    MCHC 32.3 (L) 33.7 - 35.3 g/dL    RDW 49.8 35.9 - 50.0 fL    Platelet Count 171 164 - 446 K/uL    MPV 10.3 9.0 - 12.9 fL    Neutrophils-Polys 82.70 (H) 44.00 - 72.00 %    Lymphocytes 6.30 (L) 22.00 - 41.00 %    Monocytes 10.60 0.00 - 13.40 %    Eosinophils 0.00 0.00 - 6.90 %    Basophils 0.20 0.00 - 1.80 %    Immature Granulocytes 0.20 0.00 - 0.90 %    Nucleated RBC 0.00 /100 WBC    Neutrophils (Absolute) 9.44 (H) 1.82 - 7.42 K/uL    Lymphs (Absolute) 0.72 (L) 1.00 - 4.80 K/uL    Monos (Absolute) 1.21 (H) 0.00 - 0.85 K/uL    Eos (Absolute) 0.00 0.00 - 0.51 K/uL    Baso (Absolute) 0.02 0.00 - 0.12 K/uL    Immature Granulocytes (abs) 0.02 0.00 - 0.11 K/uL    NRBC (Absolute) 0.00 K/uL   COMP METABOLIC PANEL   Result Value Ref Range    Sodium 141 135 - 145 mmol/L    Potassium 4.5 3.6 - 5.5 mmol/L    Chloride 110 96 - 112 mmol/L    Co2 20 20 - 33 mmol/L    Anion Gap 11.0 0.0 - 11.9    Glucose 131 (H) 65 - 99 mg/dL    Bun 26 (H) 8 - 22 mg/dL    Creatinine 1.10 0.50 - 1.40 mg/dL    Calcium 9.1 8.5 - 10.5 mg/dL    AST(SGOT) 28 12 - 45 U/L    ALT(SGPT) 17 2 - 50 U/L    Alkaline Phosphatase 94 30 - 99 U/L    Total Bilirubin 1.1 0.1 - 1.5 mg/dL    Albumin 4.1 3.2 - 4.9 g/dL    Total Protein 6.5 6.0 - 8.2 g/dL    Globulin 2.4 1.9 - 3.5 g/dL    A-G Ratio 1.7 g/dL   LACTIC ACID   Result Value Ref Range    Lactic Acid 2.2 (H) 0.5 - 2.0 mmol/L   TROPONIN   Result Value Ref Range    Troponin I <0.01 0.00 - 0.04 ng/mL   URINALYSIS,CULTURE IF INDICATED   Result Value Ref Range    Color Yellow     Character Clear     Specific Gravity 1.023 <1.035    Ph 5.0 5.0 - 8.0    Glucose Negative Negative mg/dL     Ketones Negative Negative mg/dL    Protein Negative Negative mg/dL    Bilirubin Negative Negative    Urobilinogen, Urine 0.2 Negative    Nitrite Negative Negative    Leukocyte Esterase Negative Negative    Occult Blood Negative Negative    Micro Urine Req see below    ESTIMATED GFR   Result Value Ref Range    GFR If African American >60 >60 mL/min/1.73 m 2    GFR If Non African American >60 >60 mL/min/1.73 m 2   CREATINE KINASE   Result Value Ref Range    CPK Total 595 (H) 0 - 154 U/L   LACTIC ACID   Result Value Ref Range    Lactic Acid 1.4 0.5 - 2.0 mmol/L   EKG   Result Value Ref Range    Report       [facility] Emergency Dept[facility]    Test Date:  2019  Pt Name:    FREDERICK SCHWAB             Department: ER  MRN:        [id number]                      Room:       [facility]  Gender:     Male                         Technician: [id number]  :        [id number]                   Requested By:STEPHANIE DUFFY  Order #:    [id number]                    Reading MD:    Measurements  Intervals                                Axis  Rate:       125                          P:          0  NV:         108                          QRS:        -62  QRSD:       104                          T:          168  QT:         284  QTc:        410    Interpretive Statements  SINUS TACHYCARDIA  LEFT ANTERIOR FASCICULAR BLOCK  LATE PRECORDIAL R/S TRANSITION  REPOLARIZATION ABNORMALITY, PROB RATE RELATED  Compared to ECG 2019 13:10:46  Early repolarization now present  Sinus bradycardia no longer present  T-wave abnormality no longer present        All labs were reviewed by me.    COURSE & MEDICAL DECISION MAKING  Pertinent Labs & Imaging studies reviewed. (See chart for details)    Differential Diagnoses include but are not limited to: right hip fracture, UTI, sepsis, syncope, dehydration and pneumonia.    10:39 AM Obtained and reviewed patient's electronic medical records which indicate patient was presented to ED on  6/3/19 for diarrhea. Seen in 03/2019 for an UTI by me.    10:40 AM Patient seen and examined at bedside. Patient presents for a fall and extremity pain.    Initial orders in the Emergency Department included XR right hip, XR pelvis, and XR right femur and laboratory testing: CBC with differential, CMP, Blood culture , Lactic acid, Troponin, UA, and EKG.  Intravenous fluids administered for poor oral intake, dry mucous membranes, recent diarrhea and dehydration.    11:06 AM Reviewed lab results with the patient and his niece. Concern for infection as lactic acid is elevated at 2.2. Leukocytosis with WBC of 11.4.     12:05 PM Reviewed x-ray's which indicate acute intertrochanteric fracture of right femur with mild varus angulation. Paged Dr. Shaw (Hospitalist) and Dr. Parker (Orthopedics).    12:16 PM I discussed the patient's case and the above findings with Dr. Shaw (Hospitalist) who agrees to admit and transfer care of patient at this time    12:17 PM I discussed the patient's case and the above findings with Dr. Parker (Orthopedics) who agrees to consult patient. He would like for the patient to be cleared for surgery this evening.     12:32 PM Updated Dr. Shaw on Dr. Parker's recommendations.     12:35 PM On repeat evaluation, patient and niece were updated on XR and lab results as noted above. Images were reviewed with them. HYDRATION RESPONSE: Mucous membranes are moist. Vital signs are stable. Will continue NPO status.        Decision Making:  Patient has right hip fracture discussed case with Dr. Parker and with hospitalist patient be admitted to the hospital there is also an element that patient is been unable to care for himself at independent living and may need higher level care which is reviewed with his niece discussed case with hospitalist please see orders.    DISPOSITION  Patient will be admitted to Dr. Shaw, Hospitalist, in stable condition.       DIAGNOSIS  1. Closed fracture  dislocation of right hip joint, initial encounter (HCC)    2. Fall from ground level    3. Transient alteration of awareness               IDesiree (Scribe), am scribing for, and in the presence of, Cooper Maloney D.O.    Electronically signed by: Desiree Ferrara (Scribe), 6/8/2019    Cooper MUSA D.O. personally performed the services described in this documentation, as scribed by Desiree Ferrara in my presence, and it is both accurate and complete. C.    The note accurately reflects work and decisions made by me.  Cooper Maloney  6/8/2019  4:05 PM

## 2019-06-08 NOTE — PROGRESS NOTES
Right intertrochanteric femur fracture after ground level fall.    Plan:  - To OR today for R hip nail  - Keep NPO  - Admit to medicine

## 2019-06-08 NOTE — PROGRESS NOTES
Patient arrived from ER. Transferred to bed. CHG bath completed. Niece at bedside. Continuous pulse oximetry, scds and bed alarm applied. Condom cath in place. Patient incontinent of small amount of stool.

## 2019-06-08 NOTE — ASSESSMENT & PLAN NOTE
Secondary to ground-level fall  Status post right hemiarthroplasty   Multimodal pain therapy   Heparin 5000 every 8 hours for DVT prophylaxis (intemittently refusing)  PT OT> SNF

## 2019-06-08 NOTE — ED NOTES
Med Rec completed per nipayam at bedside  Allergies reviewed  No ORAL antibiotics in last 30 days    Family stated he is prescribed Enalapril BID but only takes once daily    Stopped ASA 6-3-19 due to thin blood

## 2019-06-08 NOTE — ANESTHESIA PREPROCEDURE EVALUATION
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   (+) DNR (do not resuscitate)   (+) Dehydration   (+) Dementia   (+) Elevated brain natriuretic peptide (BNP) level   (+) Fall from ground level   (+) Femur fracture, left (HCC)   (+) Hyperglycemia   (+) LAFB (left anterior fascicular block)   (+) Normocytic anemia   (+) Orthostatic hypotension   (+) SVT (supraventricular tachycardia) ([facility])   (+) Traumatic rhabdomyolysis ([facility])       Physical Exam    Airway   Mallampati: III  TM distance: <3 FB  Neck ROM: full       Cardiovascular   Rhythm: regular  Rate: normal     Dental - normal exam         Pulmonary   Breath sounds clear to auscultation     Abdominal    Neurological - normal exam                 Anesthesia Plan    ASA 3   ASA physical status 3 criteria: other (comment)    Plan - general             Induction: intravenous          Informed Consent:    Anesthetic plan and risks discussed with patient and healthcare power of .

## 2019-06-08 NOTE — H&P
Hospital Medicine History & Physical Note    Date of Service  6/8/2019    Primary Care Physician  Gina West D.O.    Consultants  Dr. Parker, orthopedics    Code Status  DNR/DNI    Chief Complaint  Right hip pain.     History of Presenting Illness  89 y.o. male who presented 6/8/2019 with an level fall and right hip pain.  Mr. Schwab has a past medical history of hypertension and dementia was recently seen in the emergency room on 6/3/2019 because of diarrhea and lightheadedness.  He was discharged home in stable condition.  This morning, his niece Vanessa [place], and by to visit him and found the door locked.  When she knocked on the door he was yelling that she would need to get a key.  She went down to the  and he opened the room for her and she found him on the floor on his left side.  Her medics were called and he was brought to the emergency room.  Patient has dementia is not sure how long he is on the floor but Vanessa notes that he always closes his blinds at night and the blinds were still open so she presumes it happened yesterday afternoon.  In the emergency room, Mr. Soares is found to have some dehydration, tachycardia, and rhabdomyolysis and x-ray reveals a right hip fracture.  He will be given IV fluids and will go to surgery this afternoon with Dr. Parker.  In the emergency room, I discussed with Vanessa at bedside she is looking into getting him more care perhaps ranging from independent living to assisted living, she also confirms that he has a known DO NOT RESUSCITATE status.    Review of Systems  Review of Systems   Unable to perform ROS: Dementia       Past Medical History   has a past medical history of Cancer (HCC).dementia, hypertension    Surgical History   has a past surgical history that includes hip nailing intramedullary (Left, 10/7/2015) and other orthopedic surgery.     Family History  family history includes Stroke (age of onset: 67) in his father.     Social  History   reports that he has never smoked. He has never used smokeless tobacco. He reports that he does not drink alcohol or use drugs.he lives a [facility]    Allergies  No Known Allergies    Medications  Prior to Admission Medications   Prescriptions Last Dose Informant Patient Reported? Taking?   aspirin EC (ECOTRIN) 81 MG Tablet Delayed Response 6/3/2019 at D/C Family Member Yes No   Sig: Take 81 mg by mouth every day.   enalapril (VASOTEC) 10 MG Tab 3 DAYS Family Member Yes No   Sig: Take 5 mg by mouth every day.   ferrous sulfate 325 (65 Fe) MG tablet 3 DAYS Family Member Yes No   Sig: Take 325 mg by mouth every day.      Facility-Administered Medications: None       Physical Exam  Temp:  [36.2 °C (97.2 °F)] 36.2 °C (97.2 °F)  Pulse:  [] 118  Resp:  [16-20] 20  BP: (139)/(47) 139/47  SpO2:  [93 %-99 %] 97 %    Physical Exam   Constitutional: No distress.   Thin, small stature   HENT:   Small bruise on the right temple  Mucous membranes are dry   Eyes: Right eye exhibits no discharge. Left eye exhibits no discharge. No scleral icterus.   Neck: Normal range of motion. Neck supple.   Cardiovascular: Normal rate and regular rhythm.    No murmur heard.  Pulmonary/Chest: Effort normal. No respiratory distress. He has no wheezes.   Abdominal: Soft. He exhibits no distension. There is no tenderness.   Musculoskeletal: He exhibits no edema or tenderness.   He is not moving the right leg because of pain   Neurological: He is alert.   He is awake alert and moves his extremities equally except for the right leg because of pain   Skin: Skin is warm and dry. He is not diaphoretic.   Psychiatric:   Pleasant and interactive though he is a non-historian   Nursing note and vitals reviewed.      Laboratory:  Recent Labs      06/08/19   1041   WBC  11.4*   RBC  3.50*   HEMOGLOBIN  10.9*   HEMATOCRIT  33.7*   MCV  96.3   MCH  31.1   MCHC  32.3*   RDW  49.8   PLATELETCT  171   MPV  10.3     Recent Labs       06/08/19   1041   SODIUM  141   POTASSIUM  4.5   CHLORIDE  110   CO2  20   GLUCOSE  131*   BUN  26*   CREATININE  1.10   CALCIUM  9.1     Recent Labs      06/08/19   1041   ALTSGPT  17   ASTSGOT  28   ALKPHOSPHAT  94   TBILIRUBIN  1.1   GLUCOSE  131*                 Recent Labs      06/08/19   1041   TROPONINI  <0.01       Urinalysis:    Recent Labs      06/08/19   1220   SPECGRAVITY  1.023   GLUCOSEUR  Negative   KETONES  Negative   NITRITE  Negative   LEUKESTERAS  Negative      EKG interpreted by me sinus rate of 125 flat T waves throughout left anterior fascicular block    At 1300, I reviewed his rhythm strip in the room and he is now in a sinus rate of 70  Imaging:  DX-CHEST-PORTABLE (1 VIEW)   Final Result      No acute cardiopulmonary abnormality identified.      DX-PELVIS-1 OR 2 VIEWS   Final Result      Acute intertrochanteric fracture of the right femur with mild varus angulation.      DX-FEMUR-2+ RIGHT   Final Result      Right intertrochanteric fracture with angulation      DX-FEMUR-2+ RIGHT    (Results Pending)   DX-PORTABLE FLUORO > 1 HOUR    (Results Pending)         Assessment/Plan:  I anticipate this patient will require at least two midnights for appropriate medical management, necessitating inpatient admission.    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 Parker, orthopedics, has been consulted for the emergency room and will be taken to surgery today  After IV fluids been given, I consider him to be medically optimized for surgery today keep him n.p.o.  IV morphine has been ordered for pain control.  His niece will look into transitioning from independent living into assisted living       Traumatic rhabdomyolysis (HCC)- (present on admission)   Assessment & Plan    Ground level fall for which he was on the floor over night  CPK in the ER is 595  IV fluids will be given     DNR (do not resuscitate)- (present on  admission)   Assessment & Plan    She has a known DO NOT RESUSCITATE status and his niece who is at bedside confirm this  Orders have been written to this effect     Dehydration- (present on admission)   Assessment & Plan    She was on the floor overnight and had not had any recent oral intake  His labs reflect dehydration given his elevated BUN  He was tachycardic upon presentation  IV fluids will be given     Dementia- (present on admission)   Assessment & Plan    Chronic condition         VTE prophylaxis: SCDs

## 2019-06-08 NOTE — CONSULTS
6/8/2019    Reason for consultation: Right intertrochanteric femur fracture    Consultation on Frederick Louis Schwab at the request of Dr. Maloney for a right intertrochanteric femur fracture.  The patient is a 89 y.o. male who presents with a right intertrochanteric femur fracture due to mechanical ground level fall at his assisted living, possibly sometime yesterday.  The patient was found down, and could not bear weight on the right lower extremity due to pain.  They were evaluated in the ER, and Orthopedics was consulted. Patient denies numbness, paresthesias, loss of consciousness or other symptoms.  He is accompanied by his niece, who serves as his POA due to his history of dementia.    Past Medical History:   Diagnosis Date   • Cancer (HCC)     skin cancer        Past Surgical History:   Procedure Laterality Date   • HIP NAILING INTRAMEDULLARY Left 10/7/2015    Procedure: HIP NAILING INTRAMEDULLARY- Intertan ;  Surgeon: Sloan Sin M.D.;  Location: SURGERY [facility];  Service:    • OTHER ORTHOPEDIC SURGERY      left hip replacement       Medications  No current facility-administered medications on file prior to encounter.      Current Outpatient Prescriptions on File Prior to Encounter   Medication Sig Dispense Refill   • aspirin EC (ECOTRIN) 81 MG Tablet Delayed Response Take 81 mg by mouth every day.     • ferrous sulfate 325 (65 Fe) MG tablet Take 325 mg by mouth every day.     • enalapril (VASOTEC) 10 MG Tab Take 5 mg by mouth every day.         Allergies  Patient has no known allergies.    ROS  Per HPI. All other systems were reviewed and found to be negative    Family History   Problem Relation Age of Onset   • Stroke Father 67       Social History     Social History   • Marital status: Single     Spouse name: N/A   • Number of children: N/A   • Years of education: N/A     Social History Main Topics   • Smoking status: Never Smoker   • Smokeless tobacco: Never Used   • Alcohol use No   •  "Drug use: No   • Sexual activity: Not on file     Other Topics Concern   • Not on file     Social History Narrative   • No narrative on file       Physical Exam  Vitals  /55   Pulse 86   Temp 36.3 °C (97.4 °F) (Temporal)   Resp 18   Ht 1.651 m (5' 5\")   Wt 62.1 kg (137 lb)   SpO2 97%   General: Well Developed, Well Nourished, no acute distress  Psychiatric: Alert and oriented x3, appropriate responses to questions, pleasant mood and affect.  HEENT: Normocephalic, atraumatic  Eyes: Anicteric, PERRLA, EOMI  Neck: Supple, nontender, no masses  Chest: Symmetric expansion of the chest wall, non-tender to palpation, no distress.  Heart: RRR, palpable peripheral pulses  Abdomen: Soft, NT, ND  Skin: Intact, no open wounds  Extremities: Pain with movement of right hip  Neuro: Intact light touch sensation right foot, intact motors TA/GS/EHL/P on right  Vascular: 2+ DP on right, Capillary refill <2 seconds    Radiographs:  DX-CHEST-PORTABLE (1 VIEW)   Final Result      No acute cardiopulmonary abnormality identified.      DX-PELVIS-1 OR 2 VIEWS   Final Result      Acute intertrochanteric fracture of the right femur with mild varus angulation.      DX-FEMUR-2+ RIGHT   Final Result      Right intertrochanteric fracture with angulation      DX-FEMUR-2+ RIGHT    (Results Pending)   DX-PORTABLE FLUORO > 1 HOUR    (Results Pending)       Laboratory Values  Recent Labs      06/08/19   1041   WBC  11.4*   RBC  3.50*   HEMOGLOBIN  10.9*   HEMATOCRIT  33.7*   MCV  96.3   MCH  31.1   MCHC  32.3*   RDW  49.8   PLATELETCT  171   MPV  10.3     Recent Labs      06/08/19   1041   SODIUM  141   POTASSIUM  4.5   CHLORIDE  110   CO2  20   GLUCOSE  131*   BUN  26*   CPKTOTAL  595*             Impression:    #1 Right intertrochanteric femur fracture    Plan:    I recommended operative treatment of his fracture. Risks and benefits of surgery were discussed which include, but are not limited to bleeding, infection, neurovascular damage, " malunion, nonunion, persistent hip pain, symptomatic hardware, DVT, PE, MI, Stroke and death.  Benefits of surgery discussed included improved chance of union in acceptable alignment and reduction in the medical risks of hip fractures.  We also discussed therapeutic alternatives to surgery, including non-operative management, which I did not recommend.  His niece understands these risks and benefits and wishes to proceed.      Please keep NPO pending surgery.  Non-weightbearing affected extremity pending surgery.    Please see operative note for detailed post-operative plan, including post-op weightbearing status.

## 2019-06-09 LAB
ANION GAP SERPL CALC-SCNC: 9 MMOL/L (ref 0–11.9)
BUN SERPL-MCNC: 23 MG/DL (ref 8–22)
CALCIUM SERPL-MCNC: 8.1 MG/DL (ref 8.5–10.5)
CHLORIDE SERPL-SCNC: 111 MMOL/L (ref 96–112)
CK SERPL-CCNC: 631 U/L (ref 0–154)
CO2 SERPL-SCNC: 20 MMOL/L (ref 20–33)
CREAT SERPL-MCNC: 1.14 MG/DL (ref 0.5–1.4)
GLUCOSE SERPL-MCNC: 123 MG/DL (ref 65–99)
POTASSIUM SERPL-SCNC: 4.8 MMOL/L (ref 3.6–5.5)
SODIUM SERPL-SCNC: 140 MMOL/L (ref 135–145)

## 2019-06-09 PROCEDURE — 700111 HCHG RX REV CODE 636 W/ 250 OVERRIDE (IP): Performed by: INTERNAL MEDICINE

## 2019-06-09 PROCEDURE — 97166 OT EVAL MOD COMPLEX 45 MIN: CPT

## 2019-06-09 PROCEDURE — 80048 BASIC METABOLIC PNL TOTAL CA: CPT

## 2019-06-09 PROCEDURE — 770001 HCHG ROOM/CARE - MED/SURG/GYN PRIV*

## 2019-06-09 PROCEDURE — 99232 SBSQ HOSP IP/OBS MODERATE 35: CPT | Performed by: INTERNAL MEDICINE

## 2019-06-09 PROCEDURE — 36415 COLL VENOUS BLD VENIPUNCTURE: CPT

## 2019-06-09 PROCEDURE — 82550 ASSAY OF CK (CPK): CPT

## 2019-06-09 PROCEDURE — A9270 NON-COVERED ITEM OR SERVICE: HCPCS | Performed by: HOSPITALIST

## 2019-06-09 PROCEDURE — 700105 HCHG RX REV CODE 258: Performed by: HOSPITALIST

## 2019-06-09 PROCEDURE — A9270 NON-COVERED ITEM OR SERVICE: HCPCS | Performed by: INTERNAL MEDICINE

## 2019-06-09 PROCEDURE — 700102 HCHG RX REV CODE 250 W/ 637 OVERRIDE(OP): Performed by: INTERNAL MEDICINE

## 2019-06-09 PROCEDURE — 700102 HCHG RX REV CODE 250 W/ 637 OVERRIDE(OP): Performed by: HOSPITALIST

## 2019-06-09 PROCEDURE — 97161 PT EVAL LOW COMPLEX 20 MIN: CPT

## 2019-06-09 RX ORDER — HEPARIN SODIUM 5000 [USP'U]/ML
5000 INJECTION, SOLUTION INTRAVENOUS; SUBCUTANEOUS EVERY 8 HOURS
Status: DISCONTINUED | OUTPATIENT
Start: 2019-06-09 | End: 2019-06-12 | Stop reason: HOSPADM

## 2019-06-09 RX ORDER — ACETAMINOPHEN 500 MG
1000 TABLET ORAL EVERY 6 HOURS
Status: DISCONTINUED | OUTPATIENT
Start: 2019-06-09 | End: 2019-06-12 | Stop reason: HOSPADM

## 2019-06-09 RX ADMIN — SENNOSIDES,DOCUSATE SODIUM 2 TABLET: 8.6; 5 TABLET, FILM COATED ORAL at 06:13

## 2019-06-09 RX ADMIN — SODIUM CHLORIDE, POTASSIUM CHLORIDE, SODIUM LACTATE AND CALCIUM CHLORIDE: 600; 310; 30; 20 INJECTION, SOLUTION INTRAVENOUS at 12:27

## 2019-06-09 RX ADMIN — ACETAMINOPHEN 650 MG: 325 TABLET, FILM COATED ORAL at 06:13

## 2019-06-09 RX ADMIN — HEPARIN SODIUM 5000 UNITS: 5000 INJECTION, SOLUTION INTRAVENOUS; SUBCUTANEOUS at 15:58

## 2019-06-09 RX ADMIN — SODIUM CHLORIDE, POTASSIUM CHLORIDE, SODIUM LACTATE AND CALCIUM CHLORIDE: 600; 310; 30; 20 INJECTION, SOLUTION INTRAVENOUS at 06:21

## 2019-06-09 RX ADMIN — ACETAMINOPHEN 1000 MG: 500 TABLET ORAL at 18:36

## 2019-06-09 ASSESSMENT — COGNITIVE AND FUNCTIONAL STATUS - GENERAL
PERSONAL GROOMING: A LOT
TOILETING: A LOT
MOBILITY SCORE: 10
TURNING FROM BACK TO SIDE WHILE IN FLAT BAD: A LOT
SUGGESTED CMS G CODE MODIFIER DAILY ACTIVITY: CK
PERSONAL GROOMING: A LITTLE
MOVING TO AND FROM BED TO CHAIR: A LOT
DAILY ACTIVITIY SCORE: 14
DRESSING REGULAR LOWER BODY CLOTHING: A LOT
TOILETING: A LOT
EATING MEALS: A LITTLE
DAILY ACTIVITIY SCORE: 14
DRESSING REGULAR UPPER BODY CLOTHING: A LOT
TURNING FROM BACK TO SIDE WHILE IN FLAT BAD: A LOT
MOVING FROM LYING ON BACK TO SITTING ON SIDE OF FLAT BED: A LOT
DRESSING REGULAR LOWER BODY CLOTHING: A LITTLE
WALKING IN HOSPITAL ROOM: A LOT
MOVING TO AND FROM BED TO CHAIR: A LOT
STANDING UP FROM CHAIR USING ARMS: A LOT
HELP NEEDED FOR BATHING: A LOT
DRESSING REGULAR UPPER BODY CLOTHING: A LOT
CLIMB 3 TO 5 STEPS WITH RAILING: A LOT
MOBILITY SCORE: 12
SUGGESTED CMS G CODE MODIFIER DAILY ACTIVITY: CK
CLIMB 3 TO 5 STEPS WITH RAILING: TOTAL
SUGGESTED CMS G CODE MODIFIER MOBILITY: CL
EATING MEALS: A LITTLE
STANDING UP FROM CHAIR USING ARMS: A LOT
HELP NEEDED FOR BATHING: A LOT
SUGGESTED CMS G CODE MODIFIER MOBILITY: CL
WALKING IN HOSPITAL ROOM: TOTAL
MOVING FROM LYING ON BACK TO SITTING ON SIDE OF FLAT BED: A LOT

## 2019-06-09 ASSESSMENT — GAIT ASSESSMENTS
DEVIATION: ATAXIC;STEP TO
GAIT LEVEL OF ASSIST: MODERATE ASSIST
DISTANCE (FEET): 1
ASSISTIVE DEVICE: FRONT WHEEL WALKER

## 2019-06-09 ASSESSMENT — ENCOUNTER SYMPTOMS
DIZZINESS: 0
CHILLS: 0
MYALGIAS: 1
SHORTNESS OF BREATH: 0
WEAKNESS: 1
ABDOMINAL PAIN: 0
FEVER: 0
NAUSEA: 0
VOMITING: 0

## 2019-06-09 ASSESSMENT — PATIENT HEALTH QUESTIONNAIRE - PHQ9
1. LITTLE INTEREST OR PLEASURE IN DOING THINGS: NOT AT ALL
SUM OF ALL RESPONSES TO PHQ9 QUESTIONS 1 AND 2: 0
2. FEELING DOWN, DEPRESSED, IRRITABLE, OR HOPELESS: NOT AT ALL

## 2019-06-09 ASSESSMENT — ACTIVITIES OF DAILY LIVING (ADL): TOILETING: INDEPENDENT

## 2019-06-09 NOTE — ANESTHESIA TIME REPORT
Anesthesia Start and Stop Event Times     Date Time Event    6/8/2019 1658 Anesthesia Start     1751 Anesthesia Stop        Responsible Staff  06/08/19    Name Role Begin End    Jairo Miles M.D. Anesth 1653 1751        Preop Diagnosis (Free Text):  Pre-op Diagnosis     RIGHT HIP FRACTURE        Preop Diagnosis (Codes):  Diagnosis Information     Diagnosis Code(s):         Post op Diagnosis  S/P right hip fracture      Premium Reason  A. 3PM - 7AM    Comments:

## 2019-06-09 NOTE — THERAPY
"Occupational Therapy Evaluation completed.   Functional Status:  Mod assist bed mobility and transfers. Mod cues for sequence and initiation of activity.   Plan of Care: 3 x weekly to focus on transfers, ADLs, safety  Discharge Recommendations:  Equipment: TBD. Post-acute therapy Recommend inpatient transitional care services for continued occupational therapy services.     See \"Rehab Therapy-Acute\" Patient Summary Report for complete documentation.    "

## 2019-06-09 NOTE — ANESTHESIA QCDR
2019 Qualified Clinical Data Registry (for Quality Improvement)     Postoperative nausea/vomiting risk protocol (Adult = 18 yrs and Pediatric 3-17 yrs)- (430 and 463)  General inhalation anesthetic (NOT TIVA) with PONV risk factors: No  Provision of anti-emetic therapy with at least 2 different classes of agents: N/A  Patient DID NOT receive anti-emetic therapy and reason is documented in Medical Record: N/A    Multimodal Pain Management- (AQI59)  Patient undergoing Elective Surgery (i.e. Outpatient, or ASC, or Prescheduled Surgery prior to Hospital Admission): No  Use of Multimodal Pain Management, two or more drugs and/or interventions, NOT including systemic opioids: N/A  Exception: Documented allergy to multiple classes of analgesics: N/A    PACU assessment of acute postoperative pain prior to Anesthesia Care End- Applies to Patients Age = 18- (ABG7)  Initial PACU pain score is which of the following: < 7/10  Patient unable to report pain score: N/A    Post-anesthetic transfer of care checklist/protocol to PACU/ICU- (426 and 427)  Upon conclusion of case, patient transferred to which of the following locations: PACU/Non-ICU  Use of transfer checklist/protocol: Yes  Exclusion: Service Performed in Patient Hospital Room (and thus did not require transfer): N/A    PACU Reintubation- (AQI31)  General anesthesia requiring endotracheal intubation (ETT) along with subsequent extubation in OR or PACU: No  Required reintubation in the PACU: N/A  Extubation was a planned trial documented in the medical record prior to removal of the original airway device: N/A    Unplanned admission to ICU related to anesthesia service up through end of PACU care- (MD51)  Unplanned admission to ICU (not initially anticipated at anesthesia start time): Yes

## 2019-06-09 NOTE — CARE PLAN
Problem: Venous Thromboembolism (VTW)/Deep Vein Thrombosis (DVT) Prevention:  Goal: Patient will participate in Venous Thrombosis (VTE)/Deep Vein Thrombosis (DVT)Prevention Measures  Outcome: PROGRESSING AS EXPECTED  SCDs in place.    Problem: Pain Management  Goal: Pain level will decrease to patient's comfort goal  Outcome: PROGRESSING AS EXPECTED  Pain controlled with tylenol.

## 2019-06-09 NOTE — THERAPY
"88 y/o male adm for after GLF, sustained Right  femur IT fx s/p IMN, WBAT. Hx of dementia. Pain le limiting mobilty. Acute PT to address RLE ROM, strength, balance, act tolerance, functional mobility to achieve higher level of function    Physical Therapy Evaluation completed.   Bed Mobility:  Supine to Sit: Maximal Assist  Transfers: Sit to Stand: Moderate Assist  Gait: Level Of Assist: Moderate Assist with Front-Wheel Walker       Plan of Care: Will benefit from Physical Therapy 4 times per week  Discharge Recommendations: Equipment: Will Continue to Assess for Equipment Needs. Post-acute therapy Discharge to a transitional care facility for continued skilled therapy services.    See \"Rehab Therapy-Acute\" Patient Summary Report for complete documentation.     "

## 2019-06-09 NOTE — ANESTHESIA PROCEDURE NOTES
Airway  Performed by: YAO RASMUSSEN  Authorized by: YAO RASMUSSEN     Location:  OR  Urgency:  Elective  Indications for Airway Management:  Anesthesia  Spontaneous Ventilation: absent    Sedation Level:  Deep  Preoxygenated: Yes    Final Airway Type:  Supraglottic airway  Final Supraglottic Airway:  Standard LMA  SGA Size:  4  Number of Attempts at Approach:  1

## 2019-06-09 NOTE — PROGRESS NOTES
Hospital Medicine Daily Progress Note    Date of Service  6/9/2019    Chief Complaint  89 y.o. male admitted 6/8/2019 with right intertrochanteric femur fracture    Hospital Course    See below      Interval Problem Update  Right IT femur fracture-status post hemiarthroplasty postop day #1.  Pain is doing well.  No new numbness or weakness.    Consultants/Specialty  Orthopedic surgeon    Code Status  DNR/DNI    Disposition  Orthopedic floor than skilled nursing    Review of Systems  Review of Systems   Constitutional: Negative for chills and fever.   Respiratory: Negative for shortness of breath.    Cardiovascular: Negative for chest pain and leg swelling.   Gastrointestinal: Negative for abdominal pain, nausea and vomiting.   Musculoskeletal: Positive for joint pain and myalgias.   Neurological: Positive for weakness. Negative for dizziness.   All other systems reviewed and are negative.       Physical Exam  Temp:  [36.2 °C (97.1 °F)-37.4 °C (99.4 °F)] 36.4 °C (97.6 °F)  Pulse:  [73-99] 86  Resp:  [15-20] 17  BP: ()/(48-89) 112/55  SpO2:  [91 %-100 %] 96 %    Physical Exam   Constitutional: He is oriented to person, place, and time. He appears well-developed and well-nourished. No distress.   Mild confusion but pleasant and interactive   HENT:   Head: Normocephalic and atraumatic.   Mouth/Throat: No oropharyngeal exudate.   Eyes: Pupils are equal, round, and reactive to light. No scleral icterus.   Neck: Normal range of motion. Neck supple. No thyromegaly present.   Cardiovascular: Normal rate, regular rhythm, normal heart sounds and intact distal pulses.    No murmur heard.  Pulmonary/Chest: Effort normal and breath sounds normal. No respiratory distress.   Abdominal: Soft. Bowel sounds are normal. He exhibits no distension. There is no tenderness.   Musculoskeletal: Normal range of motion. He exhibits tenderness. He exhibits no edema.   Lateral aspect of proximal right leg with multiple surgical dressings  clean dry and intact  2+ DP pulses and warm peripherally   Neurological: He is alert and oriented to person, place, and time. No cranial nerve deficit.   Skin: Skin is warm and dry. No rash noted.   Psychiatric: He has a normal mood and affect.   Nursing note and vitals reviewed.      Fluids    Intake/Output Summary (Last 24 hours) at 06/09/19 1351  Last data filed at 06/08/19 1745   Gross per 24 hour   Intake              600 ml   Output                0 ml   Net              600 ml       Laboratory  Recent Labs      06/08/19   1041   WBC  11.4*   RBC  3.50*   HEMOGLOBIN  10.9*   HEMATOCRIT  33.7*   MCV  96.3   MCH  31.1   MCHC  32.3*   RDW  49.8   PLATELETCT  171   MPV  10.3     Recent Labs      06/08/19   1041  06/09/19   0151   SODIUM  141  140   POTASSIUM  4.5  4.8   CHLORIDE  110  111   CO2  20  20   GLUCOSE  131*  123*   BUN  26*  23*   CREATININE  1.10  1.14   CALCIUM  9.1  8.1*                   Imaging  DX-FEMUR-2+ RIGHT   Final Result      Intraoperative images as described.      DX-CHEST-PORTABLE (1 VIEW)   Final Result      No acute cardiopulmonary abnormality identified.      DX-PELVIS-1 OR 2 VIEWS   Final Result      Acute intertrochanteric fracture of the right femur with mild varus angulation.      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)   Assessment & Plan    Secondary to ground-level fall  -Status post right hemiarthroplasty postop day #1 doing well  -Multimodal pain therapy and heparin 5000 every 8 hours for DVT prophylaxis  -PT OT       Traumatic rhabdomyolysis (HCC)- (present on admission)   Assessment & Plan    Ground level fall for which he was on the floor over night  CPK in the ER is 595, repeating CPK today  Reduce lactated Ringer's at 75 mL/h     DNR (do not resuscitate)- (present on admission)    Assessment & Plan    She has a known DO NOT RESUSCITATE status and his niece who is at bedside confirm this  Orders have been written to this effect     Dehydration- (present on admission)   Assessment & Plan    -Blood pressure and heart rate are improving, as noted for IV fluids     Dementia- (present on admission)   Assessment & Plan    Chronic condition, no behavioral issues at this time          VTE prophylaxis: heparin

## 2019-06-09 NOTE — OP REPORT
DATE OF OPERATION: 6/8/2019     PREOPERATIVE DIAGNOSIS: right intertrochanteric femur fracture    POSTOPERATIVE DIAGNOSIS: Same    PROCEDURE PERFORMED: Surgical treatment of right intertrochanteric femur fracture with intramedullary device    SURGEON: Pasquale Parker M.D.     ANESTHESIOLOGIST: Jairo Miles MD.    ANESTHESIA: General    ASA CLASSIFICATION: III    INDICATIONS: The patient is a 89 y.o. male with a right intertrochanteric femur fracture resulting from a ground level fall.  The patient denies antecedent pain, and was found to have a normal neurovascular exam and skin envelope.  Radiographs demonstrated the intertrochanteric femur fracture.  Given these findings, the patient is an appropriately indicated candidate for surgical treatment of the intertrochanteric fracture with an intramedullary device.  I discussed the risks and benefits of the procedure, including the risks of infection, wound healing complication, neurovascular injury, persistent hip pain, malunion, non-union, malrotation, and the medical risks of anesthesia including MI, stroke, and death.  Benefits include early mobilization, improved chance of union, and reduction in the medical risks of hip fractures.  Alternatives to surgery were also discussed, including non-operative management, which I did not recommend.  The patient was in agreement with the plan to proceed, and the informed consent was signed and documented.  I met with the patient pre-operatively and marked the operative extremity with their agreement.  We proceeded to the operating room.     WOUND CLASSIFICATION: Class I    SPECIMEN: None    ESTIMATED BLOOD LOSS: 50 mL    PROCEDURE:  The patient underwent anesthesia, and was positioned supine on the fracture table with all bony prominences were well padded.  Sequential compression devices were employed.  Preoperative imaging was obtained, demonstrated anatomic reduction of the fracture using the table. The correct operative  site was prepped and draped into a sterile field, and the surgical team scrubbed in.  A procedural pause was conducted to verify correct patient, correct extremity, presence of the surgeons initials on the operative extremity, and administration of IV antibiotics, in this case, Ancef.    The starting guide pin for the OIC hip nail was advanced down to a start point on the greater trochanter, and its position was confirmed on fluoroscopy.  This was advanced into the femur on power.  An incision was made around the pin, and the entry reamer was then used to gain access to the femoral canal.  The guide pin was then removed.    We then selected a 11 mm x 180 cm x 127.5 degree OIC hip nail and advanced the nail over the guide pin to an appropriate depth, confirmed by fluoroscopy.    The guide for the lag screw was then advanced down to bone through a lateral thigh incision.  The starting guide pin was advanced to a central position within the femoral neck/head, confirmed by fluoroscopy.  We measured our lag screw, and reamed for our lag screw.  We then placed the lag screw by hand.  The fracture was compressed, and the set screw was locked proximally.  All instruments were removed from the proximal femur, and final fluoroscopic images obtained.    We then placed one statically interlocked distal screw through the jig, obtaining good bicortical purchase.    We then irrigated all wounds with normal saline, and closed in layers, with 2-0 Vicryl in the subcutaneous tissue, and staples in the skin.  Sterile dressings were applied.       The patient tolerated the procedure well. There were no apparent complications. All sponge, needle, and instrument counts were correct on two separate occasions. He was awakened, extubated, and transferred to the recovery room in satisfactory condition.     Post-Operative Plan:    1.  The patient should bear weight as tolerated on their operative extremity.  Gait aids (crutch or crutches,  cane, walker) may be used as needed, and may be discontinued when no longer required.  2.  IV antibiotics - may be continued for 24 hours, but are not required.  3.  DVT prophylaxis - SCD's and Lovenox 40 mg SQ daily while inpatient.  The patient may transition to Aspirin 325 mg PO BID as an outpatient  4.  Discharge planning  ____________________________________   Pasquale Parker M.D.   DD: 6/8/2019  6:19 PM

## 2019-06-09 NOTE — OR NURSING
Pt calm and sleeping at this time. Easily awaken. Pt oriented to self only. VSS. Dressing to right leg, CDI. Pt denies pain at this time. Pt able to move bilateral lower extremities. Pt denies nausea. SCDs in place. Report given to RAMON Bowens. Left phone voice message for Pt's niece. Awaiting transport. Will continue to monitor Pt closely.

## 2019-06-09 NOTE — ANESTHESIA PROCEDURE NOTES
Peripheral IV  Date/Time: 6/8/2019 5:11 PM  Performed by: YAO RASMUSSEN  Authorized by: YAO RASMUSSEN     Size:  18 G  Site Prep:  Alcohol  Attempts:  1

## 2019-06-10 LAB
ALBUMIN SERPL BCP-MCNC: 2.8 G/DL (ref 3.2–4.9)
ALBUMIN/GLOB SERPL: 1.6 G/DL
ALP SERPL-CCNC: 65 U/L (ref 30–99)
ALT SERPL-CCNC: 14 U/L (ref 2–50)
ANION GAP SERPL CALC-SCNC: 4 MMOL/L (ref 0–11.9)
AST SERPL-CCNC: 32 U/L (ref 12–45)
BASOPHILS # BLD AUTO: 0.2 % (ref 0–1.8)
BASOPHILS # BLD: 0.02 K/UL (ref 0–0.12)
BILIRUB SERPL-MCNC: 0.9 MG/DL (ref 0.1–1.5)
BUN SERPL-MCNC: 23 MG/DL (ref 8–22)
CALCIUM SERPL-MCNC: 8.1 MG/DL (ref 8.5–10.5)
CHLORIDE SERPL-SCNC: 109 MMOL/L (ref 96–112)
CK SERPL-CCNC: 482 U/L (ref 0–154)
CO2 SERPL-SCNC: 25 MMOL/L (ref 20–33)
CREAT SERPL-MCNC: 1.23 MG/DL (ref 0.5–1.4)
EOSINOPHIL # BLD AUTO: 0.05 K/UL (ref 0–0.51)
EOSINOPHIL NFR BLD: 0.5 % (ref 0–6.9)
ERYTHROCYTE [DISTWIDTH] IN BLOOD BY AUTOMATED COUNT: 53.8 FL (ref 35.9–50)
GLOBULIN SER CALC-MCNC: 1.7 G/DL (ref 1.9–3.5)
GLUCOSE SERPL-MCNC: 116 MG/DL (ref 65–99)
HCT VFR BLD AUTO: 23.3 % (ref 42–52)
HGB BLD-MCNC: 7.2 G/DL (ref 14–18)
IMM GRANULOCYTES # BLD AUTO: 0.03 K/UL (ref 0–0.11)
IMM GRANULOCYTES NFR BLD AUTO: 0.3 % (ref 0–0.9)
LYMPHOCYTES # BLD AUTO: 1.3 K/UL (ref 1–4.8)
LYMPHOCYTES NFR BLD: 14.2 % (ref 22–41)
MCH RBC QN AUTO: 31.2 PG (ref 27–33)
MCHC RBC AUTO-ENTMCNC: 30.9 G/DL (ref 33.7–35.3)
MCV RBC AUTO: 100.9 FL (ref 81.4–97.8)
MONOCYTES # BLD AUTO: 1.46 K/UL (ref 0–0.85)
MONOCYTES NFR BLD AUTO: 15.9 % (ref 0–13.4)
NEUTROPHILS # BLD AUTO: 6.32 K/UL (ref 1.82–7.42)
NEUTROPHILS NFR BLD: 68.9 % (ref 44–72)
NRBC # BLD AUTO: 0 K/UL
NRBC BLD-RTO: 0 /100 WBC
PLATELET # BLD AUTO: 102 K/UL (ref 164–446)
PMV BLD AUTO: 10.7 FL (ref 9–12.9)
POTASSIUM SERPL-SCNC: 4.3 MMOL/L (ref 3.6–5.5)
PROT SERPL-MCNC: 4.5 G/DL (ref 6–8.2)
RBC # BLD AUTO: 2.31 M/UL (ref 4.7–6.1)
SODIUM SERPL-SCNC: 138 MMOL/L (ref 135–145)
WBC # BLD AUTO: 9.2 K/UL (ref 4.8–10.8)

## 2019-06-10 PROCEDURE — A9270 NON-COVERED ITEM OR SERVICE: HCPCS | Performed by: INTERNAL MEDICINE

## 2019-06-10 PROCEDURE — 82550 ASSAY OF CK (CPK): CPT

## 2019-06-10 PROCEDURE — 700105 HCHG RX REV CODE 258: Performed by: INTERNAL MEDICINE

## 2019-06-10 PROCEDURE — 85025 COMPLETE CBC W/AUTO DIFF WBC: CPT

## 2019-06-10 PROCEDURE — 770001 HCHG ROOM/CARE - MED/SURG/GYN PRIV*

## 2019-06-10 PROCEDURE — 99232 SBSQ HOSP IP/OBS MODERATE 35: CPT | Performed by: INTERNAL MEDICINE

## 2019-06-10 PROCEDURE — 700102 HCHG RX REV CODE 250 W/ 637 OVERRIDE(OP): Performed by: INTERNAL MEDICINE

## 2019-06-10 PROCEDURE — 80053 COMPREHEN METABOLIC PANEL: CPT

## 2019-06-10 PROCEDURE — 36415 COLL VENOUS BLD VENIPUNCTURE: CPT

## 2019-06-10 PROCEDURE — 700111 HCHG RX REV CODE 636 W/ 250 OVERRIDE (IP): Performed by: INTERNAL MEDICINE

## 2019-06-10 RX ADMIN — HEPARIN SODIUM 5000 UNITS: 5000 INJECTION, SOLUTION INTRAVENOUS; SUBCUTANEOUS at 00:42

## 2019-06-10 RX ADMIN — HEPARIN SODIUM 5000 UNITS: 5000 INJECTION, SOLUTION INTRAVENOUS; SUBCUTANEOUS at 14:53

## 2019-06-10 RX ADMIN — SODIUM CHLORIDE, POTASSIUM CHLORIDE, SODIUM LACTATE AND CALCIUM CHLORIDE: 600; 310; 30; 20 INJECTION, SOLUTION INTRAVENOUS at 00:46

## 2019-06-10 RX ADMIN — ACETAMINOPHEN 1000 MG: 500 TABLET ORAL at 00:39

## 2019-06-10 ASSESSMENT — ENCOUNTER SYMPTOMS
SHORTNESS OF BREATH: 0
HEADACHES: 0
MYALGIAS: 1
TINGLING: 0
PALPITATIONS: 0
DIARRHEA: 0
WEAKNESS: 1
FEVER: 0

## 2019-06-10 ASSESSMENT — PAIN SCALES - GENERAL: PAIN_LEVEL: 0

## 2019-06-10 NOTE — DIETARY
Nutrition Services: Day 2 of admit.  Frederick Louis Schwab is a 89 y.o. male with admitting DX of Hip fracture   Consult received for Poor PO & Wt Loss on Nutrition Admit Screen     Pt states his appetite is fair. Pt states he isn't use to 3 full size meals. Pt report at home he typically eats a small breakfast then he usually picks something up for lunch and for dinner he has a full meals. Pt states he is use to smaller portions. Pt unsure of wt loss and states he is not trying to lose wt. Pt reports his UBW 2 weeks ago was 138 lbs (62.7 kg). Pt declined snacks.     Assessment:  Ht: 165.1 cm, Wt 6/8: 62.143 kg via bed scale, BMI: 22.8 - Normal  Diet/Intake: Regular, Dysphagia 2, Nectar Thick - Per chart pt PO 25-50% 1 meal documented.      Evaluation:   1. Pt noted with moderate muscle loss as evidenced by slight depression in temporal region and some protrusion of his clavicles.   2. No wt loss noted from reported UBW.   3. Meds: pericolace  4. Fluids: lactated ringers infusion @ 75 mL/hr    Malnutrition Risk: No criteria noted at this time.     Recommendations/Plan:  1. Encourage intake of meals  2. Document intake of all meals as % taken in ADL's to provide interdisciplinary communication across all shifts.   3. Monitor weight.  4. Nutrition rep will continue to see patient for ongoing meal and snack preferences.  5. Obtain supplement order per RD as needed.    RD following

## 2019-06-10 NOTE — PROGRESS NOTES
Pt refusing unfractionated heparin this morning in spite of education. Pt is upset with the lab draw staff and refused anymore pokes.

## 2019-06-10 NOTE — PROGRESS NOTES
Alert, oriented X2, disoriented to time and place.  He is able to tell me that he fell and that he is in the hospital but cannot name the facility.  Right hip dressing is CDI.  Denies pain while lying in bed but does state that pain increases with movement.  Takes pills cruches in applesauce.  O2 in place at 1lpm via NC.  Very cooperative with care.  Incontinent of urine, condom cath placed on patient.  POC discussed, patient verbalizes understanding. Hourly rounding in place, call light is within reach.  Family is at bedside.

## 2019-06-10 NOTE — PROGRESS NOTES
Hospital Medicine Daily Progress Note    Date of Service  6/10/2019    Chief Complaint  89 y.o. male admitted 6/8/2019 with right intertrochanteric femur fracture    Hospital Course    See below      Interval Problem Update  Right IT femur fracture-status post hemiarthroplasty postop day #2, pain is well controlled. Refuses DVT px    Consultants/Specialty  Orthopedic surgeon    Code Status  DNR/DNI    Disposition  Orthopedic floor than skilled nursing    Review of Systems  Review of Systems   Constitutional: Negative for fever.        Slow progress   HENT: Negative for hearing loss.    Respiratory: Negative for shortness of breath.    Cardiovascular: Negative for palpitations.   Gastrointestinal: Negative for diarrhea.   Musculoskeletal: Positive for joint pain and myalgias.        Adequate pain control at this time   Neurological: Positive for weakness. Negative for tingling and headaches.   All other systems reviewed and are negative.       Physical Exam  Temp:  [36.5 °C (97.7 °F)-37.6 °C (99.6 °F)] 36.6 °C (97.8 °F)  Pulse:  [68-92] 68  Resp:  [16-17] 17  BP: ()/(45-61) 104/53  SpO2:  [95 %-96 %] 95 %    Physical Exam   Constitutional: He is oriented to person, place, and time. He appears well-developed and well-nourished. No distress.   Mild confusion but pleasant and interactive   HENT:   Head: Normocephalic and atraumatic.   Eyes: Pupils are equal, round, and reactive to light. Right eye exhibits no discharge. Left eye exhibits no discharge.   Neck: Normal range of motion. Neck supple.   Cardiovascular: Normal rate, regular rhythm, normal heart sounds and intact distal pulses.  Exam reveals no gallop.    Pulmonary/Chest: Effort normal and breath sounds normal. No stridor. He has no wheezes.   Abdominal: Soft. Bowel sounds are normal. There is no tenderness.   Musculoskeletal: Normal range of motion. He exhibits tenderness. He exhibits no edema.   Lateral aspect of proximal right leg with multiple  surgical dressings clean dry and intact  2+ DP pulses and warm peripherally  No interval clinical change noted today   Neurological: He is alert and oriented to person, place, and time. Coordination normal.   Skin: Skin is warm and dry. No erythema.   Psychiatric: He has a normal mood and affect.   Nursing note and vitals reviewed.      Fluids    Intake/Output Summary (Last 24 hours) at 06/10/19 1411  Last data filed at 06/10/19 0400   Gross per 24 hour   Intake              750 ml   Output              300 ml   Net              450 ml       Laboratory  Recent Labs      06/08/19   1041  06/10/19   0322   WBC  11.4*  9.2   RBC  3.50*  2.31*   HEMOGLOBIN  10.9*  7.2*   HEMATOCRIT  33.7*  23.3*   MCV  96.3  100.9*   MCH  31.1  31.2   MCHC  32.3*  30.9*   RDW  49.8  53.8*   PLATELETCT  171  102*   MPV  10.3  10.7     Recent Labs      06/08/19   1041  06/09/19   0151  06/10/19   0322   SODIUM  141  140  138   POTASSIUM  4.5  4.8  4.3   CHLORIDE  110  111  109   CO2  20  20  25   GLUCOSE  131*  123*  116*   BUN  26*  23*  23*   CREATININE  1.10  1.14  1.23   CALCIUM  9.1  8.1*  8.1*                   Imaging  DX-PORTABLE FLUORO > 1 HOUR   Final Result         Portable fluoroscopy utilized for 42 seconds.      DX-FEMUR-2+ RIGHT   Final Result      Intraoperative images as described.      DX-CHEST-PORTABLE (1 VIEW)   Final Result      No acute cardiopulmonary abnormality identified.      DX-PELVIS-1 OR 2 VIEWS   Final Result      Acute intertrochanteric fracture of the right femur with mild varus angulation.      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)   Assessment & Plan    Secondary to ground-level fall  -Status post right hemiarthroplasty postop day #2 and doing well  -Multimodal pain therapy and heparin 5000 every 8 hours for DVT prophylaxis (intemittently refusing)  -PT OT        Traumatic rhabdomyolysis (HCC)- (present on admission)   Assessment & Plan    Ground level fall for which he was on the floor over night  CPK is now 485, down-trending  -STOP LR     DNR (do not resuscitate)- (present on admission)   Assessment & Plan    She has a known DO NOT RESUSCITATE status and his niece who is at bedside confirm this  Orders have been written to this effect     Dehydration- (present on admission)   Assessment & Plan    -Blood pressure and heart rate are improving, stop IVF's     Dementia- (present on admission)   Assessment & Plan    Chronic condition, no behavioral issues at this time          VTE prophylaxis: heparin

## 2019-06-10 NOTE — CARE PLAN
Problem: Venous Thromboembolism (VTW)/Deep Vein Thrombosis (DVT) Prevention:  Goal: Patient will participate in Venous Thrombosis (VTE)/Deep Vein Thrombosis (DVT)Prevention Measures  Outcome: PROGRESSING AS EXPECTED  SCDs in place.    Problem: Pain Management  Goal: Pain level will decrease to patient's comfort goal  Outcome: PROGRESSING AS EXPECTED  Pain controlled with tylenol

## 2019-06-10 NOTE — PROGRESS NOTES
Three point drop in HGB this morning down to 7.2. No active bleeding. Dressing clean, dry, and intact. Sent two pages to MD. Awaiting phone call at the moment. Updated day RAMON Fall.

## 2019-06-10 NOTE — PROGRESS NOTES
"   Orthopaedic Progress Note    Interval changes:  Patient doing well post op  Dressings changed, incisions without complication  Slow to mobilize unclear if may need placement -SNF orders placed    ROS - Patient denies any new issues.  Pain well controlled.    /53   Pulse 68   Temp 36.6 °C (97.8 °F) (Temporal)   Resp 17   Ht 1.651 m (5' 5\")   Wt 62.1 kg (137 lb)   SpO2 95%       Patient seen and examined  No acute distress  Breathing non labored  RRR  LLE Surgical dressing is clean, dry, and intact. Dressings changed, surgical incisions are well approximated and are dry and clean.  There is no erythema, induration, or signs of infection at any of the incision sites.  Patient clearly fires tibialis anterior, EHL, and gastrocnemius/soleus. Sensation is intact to light touch throughout superficial peroneal, deep peroneal, tibial, saphenous, and sural nerve distributions. Strong and palpable 2+ dorsalis pedis and posterior tibial pulses with capillary refill less than 2 seconds. No lower leg tenderness or discomfort.       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

## 2019-06-11 ENCOUNTER — APPOINTMENT (OUTPATIENT)
Dept: RADIOLOGY | Facility: MEDICAL CENTER | Age: 84
DRG: 956 | End: 2019-06-11
Attending: HOSPITALIST
Payer: MEDICARE

## 2019-06-11 PROBLEM — R53.81 DEBILITY: Status: ACTIVE | Noted: 2019-06-11

## 2019-06-11 LAB
BASOPHILS # BLD AUTO: 0.2 % (ref 0–1.8)
BASOPHILS # BLD: 0.02 K/UL (ref 0–0.12)
EOSINOPHIL # BLD AUTO: 0.05 K/UL (ref 0–0.51)
EOSINOPHIL NFR BLD: 0.5 % (ref 0–6.9)
ERYTHROCYTE [DISTWIDTH] IN BLOOD BY AUTOMATED COUNT: 51.8 FL (ref 35.9–50)
HCT VFR BLD AUTO: 25.9 % (ref 42–52)
HGB BLD-MCNC: 8.1 G/DL (ref 14–18)
IMM GRANULOCYTES # BLD AUTO: 0.03 K/UL (ref 0–0.11)
IMM GRANULOCYTES NFR BLD AUTO: 0.3 % (ref 0–0.9)
LYMPHOCYTES # BLD AUTO: 1.05 K/UL (ref 1–4.8)
LYMPHOCYTES NFR BLD: 11 % (ref 22–41)
MCH RBC QN AUTO: 30.8 PG (ref 27–33)
MCHC RBC AUTO-ENTMCNC: 31.3 G/DL (ref 33.7–35.3)
MCV RBC AUTO: 98.5 FL (ref 81.4–97.8)
MONOCYTES # BLD AUTO: 1.08 K/UL (ref 0–0.85)
MONOCYTES NFR BLD AUTO: 11.3 % (ref 0–13.4)
NEUTROPHILS # BLD AUTO: 7.35 K/UL (ref 1.82–7.42)
NEUTROPHILS NFR BLD: 76.7 % (ref 44–72)
NRBC # BLD AUTO: 0 K/UL
NRBC BLD-RTO: 0 /100 WBC
PLATELET # BLD AUTO: 123 K/UL (ref 164–446)
PMV BLD AUTO: 10.8 FL (ref 9–12.9)
RBC # BLD AUTO: 2.63 M/UL (ref 4.7–6.1)
WBC # BLD AUTO: 9.6 K/UL (ref 4.8–10.8)

## 2019-06-11 PROCEDURE — 700102 HCHG RX REV CODE 250 W/ 637 OVERRIDE(OP): Performed by: INTERNAL MEDICINE

## 2019-06-11 PROCEDURE — 85025 COMPLETE CBC W/AUTO DIFF WBC: CPT

## 2019-06-11 PROCEDURE — 99232 SBSQ HOSP IP/OBS MODERATE 35: CPT | Performed by: HOSPITALIST

## 2019-06-11 PROCEDURE — A9270 NON-COVERED ITEM OR SERVICE: HCPCS | Performed by: INTERNAL MEDICINE

## 2019-06-11 PROCEDURE — 700102 HCHG RX REV CODE 250 W/ 637 OVERRIDE(OP): Performed by: HOSPITALIST

## 2019-06-11 PROCEDURE — 36415 COLL VENOUS BLD VENIPUNCTURE: CPT

## 2019-06-11 PROCEDURE — A9270 NON-COVERED ITEM OR SERVICE: HCPCS | Performed by: HOSPITALIST

## 2019-06-11 PROCEDURE — 97530 THERAPEUTIC ACTIVITIES: CPT

## 2019-06-11 PROCEDURE — 700111 HCHG RX REV CODE 636 W/ 250 OVERRIDE (IP): Performed by: INTERNAL MEDICINE

## 2019-06-11 PROCEDURE — 770001 HCHG ROOM/CARE - MED/SURG/GYN PRIV*

## 2019-06-11 PROCEDURE — 73502 X-RAY EXAM HIP UNI 2-3 VIEWS: CPT | Mod: RT

## 2019-06-11 RX ADMIN — HEPARIN SODIUM 5000 UNITS: 5000 INJECTION, SOLUTION INTRAVENOUS; SUBCUTANEOUS at 22:03

## 2019-06-11 RX ADMIN — SENNOSIDES,DOCUSATE SODIUM 2 TABLET: 8.6; 5 TABLET, FILM COATED ORAL at 09:56

## 2019-06-11 RX ADMIN — HEPARIN SODIUM 5000 UNITS: 5000 INJECTION, SOLUTION INTRAVENOUS; SUBCUTANEOUS at 09:56

## 2019-06-11 RX ADMIN — ACETAMINOPHEN 1000 MG: 500 TABLET ORAL at 09:57

## 2019-06-11 RX ADMIN — ACETAMINOPHEN 1000 MG: 500 TABLET ORAL at 12:49

## 2019-06-11 RX ADMIN — HEPARIN SODIUM 5000 UNITS: 5000 INJECTION, SOLUTION INTRAVENOUS; SUBCUTANEOUS at 14:08

## 2019-06-11 RX ADMIN — ACETAMINOPHEN 1000 MG: 500 TABLET ORAL at 22:03

## 2019-06-11 ASSESSMENT — ENCOUNTER SYMPTOMS
TINGLING: 0
SHORTNESS OF BREATH: 0
EYE DISCHARGE: 0
VOMITING: 0
PALPITATIONS: 0
SORE THROAT: 0
LOSS OF CONSCIOUSNESS: 0
STRIDOR: 0
BLOOD IN STOOL: 0
NERVOUS/ANXIOUS: 0
MYALGIAS: 1
FEVER: 0
NAUSEA: 0
HEADACHES: 0
SEIZURES: 0
DIARRHEA: 0
FLANK PAIN: 0
EYE PAIN: 0
EYE REDNESS: 0
WEAKNESS: 1
SINUS PAIN: 0

## 2019-06-11 ASSESSMENT — COGNITIVE AND FUNCTIONAL STATUS - GENERAL
DRESSING REGULAR UPPER BODY CLOTHING: A LOT
DAILY ACTIVITIY SCORE: 14
DRESSING REGULAR LOWER BODY CLOTHING: TOTAL
TOILETING: A LOT
HELP NEEDED FOR BATHING: A LOT
PERSONAL GROOMING: A LITTLE
SUGGESTED CMS G CODE MODIFIER DAILY ACTIVITY: CK

## 2019-06-11 NOTE — ANESTHESIA POSTPROCEDURE EVALUATION
Patient: Frederick Louis Schwab    Procedure Summary     Date:  06/08/19 Room / Location:  [place] [address] / SURGERY [facility]    Anesthesia Start:  1658 Anesthesia Stop:  1751    Procedure:  INSERTION, INTRAMEDULLARY SHRADDHA, FEMUR, PROXIMAL (Right Hip) Diagnosis:  (RIGHT HIP FRACTURE)    Surgeon:  Pasquale Parker M.D. Responsible Provider:  aJiro Miles M.D.    Anesthesia Type:  general ASA Status:  3          Final Anesthesia Type: general  Last vitals  BP   Blood Pressure : 116/61, NIBP: 100/54    Temp   36.7 °C (98.1 °F)    Pulse   Pulse: 63, Heart Rate (Monitored): 84   Resp   16    SpO2   96 %      Anesthesia Post Evaluation    Patient location during evaluation: bedside  Patient participation: complete - patient participated  Level of consciousness: awake  Pain score: 0    Airway patency: patent  Anesthetic complications: no  Cardiovascular status: adequate  Respiratory status: acceptable  Hydration status: acceptable               Nurse Pain Score: 0 (NPRS)

## 2019-06-11 NOTE — CARE PLAN
Problem: Venous Thromboembolism (VTW)/Deep Vein Thrombosis (DVT) Prevention:  Goal: Patient will participate in Venous Thrombosis (VTE)/Deep Vein Thrombosis (DVT)Prevention Measures  Outcome: PROGRESSING SLOWER THAN EXPECTED  SCD applied bilaterally, pt refused heparin medication despite education.    Problem: Mobility  Goal: Risk for activity intolerance will decrease  Outcome: PROGRESSING SLOWER THAN EXPECTED  Pt refused ambulation despite education.

## 2019-06-11 NOTE — PROGRESS NOTES
Pt's bed alarm triggered warning, this RN found pt sitting on the floor.  Pt is AOX1 denies pain.  MD notified of findings, new order placed for Xray of surgically repaired R hip.  Pt's Vital signs are stable.  CMS intact.

## 2019-06-11 NOTE — DISCHARGE PLANNING
Received Choice form at 1335.  Agency/Facility Name: [facility]  Referral sent per Choice form at 1340.

## 2019-06-11 NOTE — PROGRESS NOTES
Pt had a GLF at approximately 0300.  Bed alarm and fall prevention protocols in place.  Pt is AOX1 with history of dementia.  Last time pt rounding was performed on 0230, pt was asleep without any signs of distress.  Pt has condom cath on.  No med were administered that could potentially contribute to fall.  Prior to fall, pt has attempted to remove SCDs.  Pt also removed IV despite education.    MD notified of findings.  Vitals were stable, pt denies pain.  New orders received for stat xray on surgical site.  Xray results showed no new fractures.  Kardex updated with note to inform pt had fall.  Pt's family will be updated at 0600.

## 2019-06-11 NOTE — PROGRESS NOTES
Voice mail left on pt's relative Alex .  Message was left to indicate the GLF that occurred, the fall precautions in place and the xray results.

## 2019-06-11 NOTE — DISCHARGE PLANNING
Anticipated Discharge Disposition: SNF    Action: Met with patient and niece's at bedside Mili and Vanessa and they are agreeable to SNF, has been to [facility] in the past and would like him to go back there. Choice form faxed to [facility]     Barriers to Discharge: medical clearance and pending SNF acceptance.     Plan: SNF    PASRR in manual review.     Update: PASRR# [id number]

## 2019-06-12 VITALS
OXYGEN SATURATION: 97 % | TEMPERATURE: 98.1 F | HEIGHT: 65 IN | DIASTOLIC BLOOD PRESSURE: 72 MMHG | WEIGHT: 137 LBS | BODY MASS INDEX: 22.82 KG/M2 | HEART RATE: 70 BPM | SYSTOLIC BLOOD PRESSURE: 130 MMHG | RESPIRATION RATE: 17 BRPM

## 2019-06-12 PROBLEM — T79.6XXA TRAUMATIC RHABDOMYOLYSIS (HCC): Status: RESOLVED | Noted: 2019-06-08 | Resolved: 2019-06-12

## 2019-06-12 PROBLEM — N18.30 CKD (CHRONIC KIDNEY DISEASE), STAGE III: Status: ACTIVE | Noted: 2019-06-12

## 2019-06-12 PROBLEM — E86.0 DEHYDRATION: Status: RESOLVED | Noted: 2019-06-08 | Resolved: 2019-06-12

## 2019-06-12 LAB
ANION GAP SERPL CALC-SCNC: 6 MMOL/L (ref 0–11.9)
BUN SERPL-MCNC: 31 MG/DL (ref 8–22)
CALCIUM SERPL-MCNC: 8.5 MG/DL (ref 8.5–10.5)
CHLORIDE SERPL-SCNC: 108 MMOL/L (ref 96–112)
CK SERPL-CCNC: 430 U/L (ref 0–154)
CO2 SERPL-SCNC: 27 MMOL/L (ref 20–33)
CREAT SERPL-MCNC: 1.23 MG/DL (ref 0.5–1.4)
GLUCOSE SERPL-MCNC: 112 MG/DL (ref 65–99)
POTASSIUM SERPL-SCNC: 4.7 MMOL/L (ref 3.6–5.5)
SODIUM SERPL-SCNC: 141 MMOL/L (ref 135–145)

## 2019-06-12 PROCEDURE — 97116 GAIT TRAINING THERAPY: CPT

## 2019-06-12 PROCEDURE — 36415 COLL VENOUS BLD VENIPUNCTURE: CPT

## 2019-06-12 PROCEDURE — 99239 HOSP IP/OBS DSCHRG MGMT >30: CPT | Performed by: HOSPITALIST

## 2019-06-12 PROCEDURE — 700102 HCHG RX REV CODE 250 W/ 637 OVERRIDE(OP): Performed by: HOSPITALIST

## 2019-06-12 PROCEDURE — A9270 NON-COVERED ITEM OR SERVICE: HCPCS | Performed by: INTERNAL MEDICINE

## 2019-06-12 PROCEDURE — 97530 THERAPEUTIC ACTIVITIES: CPT

## 2019-06-12 PROCEDURE — 80048 BASIC METABOLIC PNL TOTAL CA: CPT

## 2019-06-12 PROCEDURE — 82550 ASSAY OF CK (CPK): CPT

## 2019-06-12 PROCEDURE — 700102 HCHG RX REV CODE 250 W/ 637 OVERRIDE(OP): Performed by: INTERNAL MEDICINE

## 2019-06-12 PROCEDURE — A9270 NON-COVERED ITEM OR SERVICE: HCPCS | Performed by: HOSPITALIST

## 2019-06-12 PROCEDURE — 700111 HCHG RX REV CODE 636 W/ 250 OVERRIDE (IP): Performed by: INTERNAL MEDICINE

## 2019-06-12 RX ORDER — POLYETHYLENE GLYCOL 3350 17 G/17G
17 POWDER, FOR SOLUTION ORAL
Refills: 3
Start: 2019-06-12

## 2019-06-12 RX ORDER — HEPARIN SODIUM 5000 [USP'U]/ML
5000 INJECTION, SOLUTION INTRAVENOUS; SUBCUTANEOUS EVERY 8 HOURS
Refills: 0
Start: 2019-06-12

## 2019-06-12 RX ORDER — ACETAMINOPHEN 500 MG
1000 TABLET ORAL EVERY 6 HOURS PRN
Start: 2019-06-12

## 2019-06-12 RX ADMIN — HEPARIN SODIUM 5000 UNITS: 5000 INJECTION, SOLUTION INTRAVENOUS; SUBCUTANEOUS at 07:20

## 2019-06-12 RX ADMIN — ACETAMINOPHEN 1000 MG: 500 TABLET ORAL at 07:20

## 2019-06-12 RX ADMIN — SENNOSIDES,DOCUSATE SODIUM 2 TABLET: 8.6; 5 TABLET, FILM COATED ORAL at 07:20

## 2019-06-12 RX ADMIN — ACETAMINOPHEN 1000 MG: 500 TABLET ORAL at 12:27

## 2019-06-12 ASSESSMENT — COGNITIVE AND FUNCTIONAL STATUS - GENERAL
TURNING FROM BACK TO SIDE WHILE IN FLAT BAD: A LOT
WALKING IN HOSPITAL ROOM: A LITTLE
DAILY ACTIVITIY SCORE: 14
CLIMB 3 TO 5 STEPS WITH RAILING: A LOT
SUGGESTED CMS G CODE MODIFIER DAILY ACTIVITY: CK
STANDING UP FROM CHAIR USING ARMS: A LITTLE
HELP NEEDED FOR BATHING: A LOT
MOVING FROM LYING ON BACK TO SITTING ON SIDE OF FLAT BED: A LITTLE
DRESSING REGULAR UPPER BODY CLOTHING: A LOT
PERSONAL GROOMING: A LITTLE
MOBILITY SCORE: 15
TOILETING: A LOT
SUGGESTED CMS G CODE MODIFIER MOBILITY: CK
DRESSING REGULAR LOWER BODY CLOTHING: TOTAL
MOVING TO AND FROM BED TO CHAIR: A LOT

## 2019-06-12 ASSESSMENT — GAIT ASSESSMENTS
GAIT LEVEL OF ASSIST: MINIMAL ASSIST
ASSISTIVE DEVICE: FRONT WHEEL WALKER
DISTANCE (FEET): 25
DEVIATION: SHUFFLED GAIT;DECREASED BASE OF SUPPORT

## 2019-06-12 NOTE — THERAPY
"Physical Therapy Treatment completed.   Bed Mobility:  Supine to Sit: Moderate Assist  Transfers: Sit to Stand: Minimal Assist  Gait: Level Of Assist: Minimal Assist with Front-Wheel Walker       Plan of Care: Will benefit from Physical Therapy 4 times per week  Discharge Recommendations: Equipment: Will Continue to Assess for Equipment Needs. Recommend inpatient transitional care services for continued physical therapy services.        See \"Rehab Therapy-Acute\" Patient Summary Report for complete documentation.     Pt pleasant and agreeable to participate in therapy session. He was able to progress with all mobility today. He required therapist to initiate movement but performed bed mobility with modA. He was able to increase and perform gait training of 25ft with Lakshmi for balance and occassional fww management. He presents with nbos and short shuffling steps provided vc to correct which at this time he demonstrated poor carryover with. He required Lakshmi for balance and vc for sequencing when transferring and turning. At current level he will benefit from continued acute skilled therapy to progress his mobility. Continue to recommend post acute palcement.   "

## 2019-06-12 NOTE — DISCHARGE INSTRUCTIONS
Discharge Instructions    Discharged to other by medical transportation with escort. Discharged via wheelchair, hospital escort: Yes.  Special equipment needed: Oxygen    Be sure to schedule a follow-up appointment with your primary care doctor or any specialists as instructed.     Discharge Plan:   Diet Plan: Discussed  Activity Level: Discussed  Confirmed Follow up Appointment: Patient to Call and Schedule Appointment  Confirmed Symptoms Management: Discussed  Medication Reconciliation Updated: Yes  Pneumococcal Vaccine Administered/Refused: (S) Not given - Patient refused pneumococcal vaccine (POA says he is up to date on immunizations. Refused.)  Influenza Vaccine Indication: Not indicated: Previously immunized this influenza season and > 8 years of age (per POA)    I understand that a diet low in cholesterol, fat, and sodium is recommended for good health. Unless I have been given specific instructions below for another diet, I accept this instruction as my diet prescription.   Other diet: Dysphagia 2 (Chopped/Pureed) Diet, Nectar thick liquids    Special Instructions: Discharge instructions for the Orthopedic Patient    Follow up with Primary Care Physician within 2 weeks of discharge to home, regarding:  Review of medications and diagnostic testing.  Surveillance for medical complications.  Workup and treatment of osteoporosis, if appropriate.     -Is this a Joint Replacement patient? No    -Is this patient being discharged with medication to prevent blood clots?  Yes, Heparin Heparin injection  What is this medicine?  HEPARIN (HEP a rin) is an anticoagulant. It is used to treat or prevent clots in the veins, arteries, lungs, or heart. It stops clots from forming or getting bigger. This medicine prevents clotting during open-heart surgery, dialysis, or in patients who are confined to bed.  This medicine may be used for other purposes; ask your health care provider or pharmacist if you have questions.  COMMON  BRAND NAME(S): Hep-Lock, Hep-Lock U/P, Hepflush-10, Monoject Prefill Advanced Heparin Lock Flush  What should I tell my health care provider before I take this medicine?  They need to know if you have any of these conditions:  -bleeding disorders, such as hemophilia or low blood platelets  -bowel disease or diverticulitis  -endocarditis  -high blood pressure  -liver disease  -recent surgery or delivery of a baby  -stomach ulcers  -an unusual or allergic reaction to heparin, benzyl alcohol, sulfites, other medicines, foods, dyes, or preservatives  -pregnant or trying to get pregnant  -breast-feeding  How should I use this medicine?  This medicine is given by injection or infusion into a vein. It can also be given by injection of small amounts under the skin. It is usually given by a health care professional in a hospital or clinic setting.  If you get this medicine at home, you will be taught how to prepare and give this medicine. Use exactly as directed. Take your medicine at regular intervals. Do not take it more often than directed. Do not stop taking except on your doctor's advice. Stopping this medicine may increase your risk of a blot clot. Be sure to refill your prescription before you run out of medicine.  It is important that you put your used needles and syringes in a special sharps container. Do not put them in a trash can. If you do not have a sharps container, call your pharmacist or healthcare provider to get one.  Talk to your pediatrician regarding the use of this medicine in children. While this medicine may be prescribed for children for selected conditions, precautions do apply.  Overdosage: If you think you have taken too much of this medicine contact a poison control center or emergency room at once.  NOTE: This medicine is only for you. Do not share this medicine with others.  What if I miss a dose?  If you miss a dose, take it as soon as you can. If it is almost time for your next dose, take  only that dose. Do not take double or extra doses.  What may interact with this medicine?  Do not take this medicine with any of the following medications:  -aspirin and aspirin-like drugs  -mifepristone  -medicines that treat or prevent blood clots like warfarin, enoxaparin, and dalteparin  -palifermin  -protamine  This medicine may also interact with the following medications:  -dextran  -digoxin  -hydroxychloroquine  -medicines for treating colds or allergies  -nicotine  -NSAIDs, medicines for pain and inflammation, like ibuprofen or naproxen  -phenylbutazone  -tetracycline antibiotics  This list may not describe all possible interactions. Give your health care provider a list of all the medicines, herbs, non-prescription drugs, or dietary supplements you use. Also tell them if you smoke, drink alcohol, or use illegal drugs. Some items may interact with your medicine.  What should I watch for while using this medicine?  While you are taking this medicine, carry an identification card with your name, the name and dose of medicine(s) being used, and the name and phone number of your doctor or health care professional or person to contact in an emergency.  Notify your doctor or health care professional and seek emergency treatment if you develop breathing problems; changes in vision; chest pain; severe, sudden headache; pain, swelling, warmth in the leg; trouble speaking; sudden numbness or weakness of the face, arm, or leg. These can be signs that your condition has gotten worse.  Notify your doctor or health care professional at once if you have cold, blue hands or feet.  If you are going to have surgery or dental work, tell your doctor or health care professional that you have received this medicine. Be careful brushing and flossing your teeth or using a toothpick while receiving this medicine because you may bleed more easily.  Avoid sports and activities that might cause injury while you are using this  medicine. Severe falls or injuries can cause unseen bleeding. Be careful when using sharp tools or knives. Consider using an electric razor.  What side effects may I notice from receiving this medicine?  Side effects that you should report to your doctor or health care professional as soon as possible:  -allergic reactions like skin rash, itching or hives, swelling of the face, lips, or tongue  -back pain  -burning or itching on the bottoms of the feet  -cold, blue, or painful hands and feet  -feeling faint or lightheaded, falls  -fever, chills  -nausea, vomiting  -signs and symptoms of bleeding such as bloody or black, tarry stools; red or dark-brown urine; spitting up blood or brown material that looks like coffee grounds; red spots on the skin; unusual bruising or bleeding from the eye, gums, or nose  -stomach pain  -unusually low blood pressure  Side effects that usually do not require medical attention (report to your doctor or health care professional if they continue or are bothersome):  -pain, redness, or irritation at site where injected  This list may not describe all possible side effects. Call your doctor for medical advice about side effects. You may report side effects to FDA at 3-242-FDA-8001.  Where should I keep my medicine?  Keep out of the reach of children.  Store unopened vials at room temperature between 15 and 30 degrees C (59 and 86 degrees F). Do not freeze. Do not use if solution is discolored or particulate matter is present. Throw away any unused medicine after the expiration date.  NOTE: This sheet is a summary. It may not cover all possible information. If you have questions about this medicine, talk to your doctor, pharmacist, or health care provider.  © 2018 Elsevier/Gold Standard (2014-04-15 16:19:24)      · Is patient discharged on Warfarin / Coumadin?   No     Depression / Suicide Risk    As you are discharged from this [facility] facility, it is important to learn how to keep  safe from harming yourself.    Recognize the warning signs:  · Abrupt changes in personality, positive or negative- including increase in energy   · Giving away possessions  · Change in eating patterns- significant weight changes-  positive or negative  · Change in sleeping patterns- unable to sleep or sleeping all the time   · Unwillingness or inability to communicate  · Depression  · Unusual sadness, discouragement and loneliness  · Talk of wanting to die  · Neglect of personal appearance   · Rebelliousness- reckless behavior  · Withdrawal from people/activities they love  · Confusion- inability to concentrate     If you or a loved one observes any of these behaviors or has concerns about self-harm, here's what you can do:  · Talk about it- your feelings and reasons for harming yourself  · Remove any means that you might use to hurt yourself (examples: pills, rope, extension cords, firearm)  · Get professional help from the community (Mental Health, Substance Abuse, psychological counseling)  · Do not be alone:Call your Safe Contact- someone whom you trust who will be there for you.  · Call your local CRISIS HOTLINE 016-0886 or 289-737-9373  · Call your local Children's Mobile Crisis Response Team [place] (191) 848-6320 or www.Anaplan  · Call the toll free National Suicide Prevention Hotlines   · National Suicide Prevention Lifeline 920-545-FVGY (4675)  · National Hope Line Network 800-SUICIDE (449-8145)      Hip Fracture  A hip fracture is a fracture of the upper part of your thigh bone (femur).  What are the causes?  A hip fracture is caused by a direct blow to the side of your hip. This is usually the result of a fall but can occur in other circumstances, such as an automobile accident.  What increases the risk?  There is an increased risk of hip fractures in people with:  · An unsteady walking pattern (gait) and those with conditions that contribute to poor balance, such as Parkinson's disease  or dementia.  · Osteopenia and osteoporosis.  · Cancer that spreads to the leg bones.  · Certain metabolic diseases.  What are the signs or symptoms?  Symptoms of hip fracture include:  · Pain over the injured hip.  · Inability to put weight on the leg in which the fracture occurred (although, some patients are able to walk after a hip fracture).  · Toes and foot of the affected leg point outward when you lie down.  How is this diagnosed?  A physical exam can determine if a hip fracture is likely to have occurred. X-ray exams are needed to confirm the fracture and to look for other injuries. The X-ray exam can help to determine the type of hip fracture. Rarely, the fracture is not visible on an X-ray image and a CT scan or MRI will have to be done.  How is this treated?  The treatment for a fracture is usually surgery. This means using a screw, nail, or victor manuel to hold the bones in place.  Follow these instructions at home:  Take all medicines as directed by your health care provider.  Contact a health care provider if:  Pain continues, even after taking pain medicine.  This information is not intended to replace advice given to you by your health care provider. Make sure you discuss any questions you have with your health care provider.  Document Released: 12/18/2006 Document Revised: 05/25/2017 Document Reviewed: 07/30/2014  Elsevier Interactive Patient Education © 2017 Elsevier Inc.

## 2019-06-12 NOTE — CARE PLAN
Problem: Safety  Goal: Will remain free from falls    Intervention: Assess risk factors for falls  High fall risk, bed alarm and lap belt in use      Problem: Urinary Elimination:  Goal: Ability to reestablish a normal urinary elimination pattern will improve    Intervention: Assess and monitor for signs and symptoms of urinary retention  Voiding adequate amounts, condom cath in place

## 2019-06-12 NOTE — PROGRESS NOTES
Patient discharged to [facility]. Report called to Hien NAVARRO. Discharge packet sent with patient.

## 2019-06-12 NOTE — PROGRESS NOTES
"   Orthopaedic Progress Note    Interval changes:  Patient doing well    Dressings CDI  Pending placement     ROS - Patient denies any new issues.  Pain well controlled.    /52   Pulse (!) 58   Temp 36.7 °C (98.1 °F) (Temporal)   Resp 17   Ht 1.651 m (5' 5\")   Wt 62.1 kg (137 lb)   SpO2 95%       Patient seen and examined  No acute distress  Breathing non labored  RRR  LLE surgical dressing is clean, dry, and intact. Dressings changed, surgical incisions are well approximated and are dry and clean.  There is no erythema, induration, or signs of infection at any of the incision sites.  Patient clearly fires tibialis anterior, EHL, and gastrocnemius/soleus. Sensation is intact to light touch throughout superficial peroneal, deep peroneal, tibial, saphenous, and sural nerve distributions. Strong and palpable 2+ dorsalis pedis and posterior tibial pulses with capillary refill less than 2 seconds. No lower leg tenderness or discomfort.       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

## 2019-06-12 NOTE — PROGRESS NOTES
[facility] Medicine Daily Progress Note    Date of Service  6/11/2019    Chief Complaint  89 y.o. male admitted 6/8/2019 with right intertrochanteric femur fracture    Hospital Course      89 y.o. male admitted 6/8/2019 with right intertrochanteric femur fracture. Right IT femur fracture-status post hemiarthroplasty.          Interval Problem Update  Hemodynamically stable overnight apart from an episode of asymptomatic bradycardia  58   Saturating well on 2 L of Oxygen NC     Right IT femur fracture-status post hemiarthroplasty, pain is well controlled.   Weakness, will need SNF, referral in 6/10  Apparently had a fall last night. Xray results showed no new fractures. Right hip fracture ORIF, appears grossly appropriate.    Cr was elevated yesterday, 1.23, I am ordering a repeat BMP and I am ordering a CPK was elevated too.   No behavioral disturbances   Discussed with patient, patient's nurse and with multidisciplinary team during rounds including , and charge nurse.       Consultants/Specialty  Orthopedic      Code Status  DNR/DNI    Disposition  SNF     Review of Systems  Review of Systems   Constitutional: Negative for fever.        Slow progress   HENT: Negative for hearing loss, sinus pain and sore throat.    Eyes: Negative for pain, discharge and redness.   Respiratory: Negative for shortness of breath and stridor.    Cardiovascular: Negative for palpitations.   Gastrointestinal: Negative for blood in stool, diarrhea, melena, nausea and vomiting.   Genitourinary: Negative for flank pain and hematuria.   Musculoskeletal: Positive for joint pain and myalgias.        Adequate pain control at this time   Neurological: Positive for weakness. Negative for tingling, seizures, loss of consciousness and headaches.   Psychiatric/Behavioral: The patient is not nervous/anxious.         Physical Exam  Temp:  [36.7 °C (98 °F)-37.8 °C (100 °F)] 36.7 °C (98.1 °F)  Pulse:  [58-85] 58  Resp:  [17-18] 17  BP:  (103-132)/(52-80) 104/52  SpO2:  [90 %-97 %] 95 %    Physical Exam   Constitutional: He appears well-developed and well-nourished. No distress.   Pleasant and interactive   HENT:   Head: Normocephalic and atraumatic.   Eyes: Pupils are equal, round, and reactive to light. Right eye exhibits no discharge. Left eye exhibits no discharge.   Neck: Normal range of motion. Neck supple. No JVD present.   Cardiovascular: Normal rate, regular rhythm, normal heart sounds and intact distal pulses.  Exam reveals no gallop.    Pulmonary/Chest: Effort normal and breath sounds normal. No stridor. He has no wheezes. He has no rales.   Abdominal: Soft. Bowel sounds are normal. He exhibits no distension. There is no tenderness. There is no rebound.   Musculoskeletal: Normal range of motion. He exhibits tenderness. He exhibits no edema.   Lateral aspect of proximal right leg with multiple surgical dressings clean dry and intact  2+ DP pulses and warm peripherally    Neurological: He is alert. Coordination normal.   Oriented to self and place    Skin: Skin is warm and dry. No erythema.   Psychiatric: He has a normal mood and affect.   Nursing note and vitals reviewed.      Fluids    Intake/Output Summary (Last 24 hours) at 06/11/19 1828  Last data filed at 06/11/19 1700   Gross per 24 hour   Intake              480 ml   Output             1300 ml   Net             -820 ml       Laboratory  Recent Labs      06/10/19   0322  06/11/19   0407   WBC  9.2  9.6   RBC  2.31*  2.63*   HEMOGLOBIN  7.2*  8.1*   HEMATOCRIT  23.3*  25.9*   MCV  100.9*  98.5*   MCH  31.2  30.8   MCHC  30.9*  31.3*   RDW  53.8*  51.8*   PLATELETCT  102*  123*   MPV  10.7  10.8     Recent Labs      06/09/19   0151  06/10/19   0322   SODIUM  140  138   POTASSIUM  4.8  4.3   CHLORIDE  111  109   CO2  20  25   GLUCOSE  123*  116*   BUN  23*  23*   CREATININE  1.14  1.23   CALCIUM  8.1*  8.1*                   Imaging  DX-HIP-COMPLETE - UNILATERAL 2+ RIGHT   Final Result          1.  Right hip fracture ORIF, appears grossly appropriate      DX-PORTABLE FLUORO > 1 HOUR   Final Result         Portable fluoroscopy utilized for 42 seconds.      DX-FEMUR-2+ RIGHT   Final Result      Intraoperative images as described.      DX-CHEST-PORTABLE (1 VIEW)   Final Result      No acute cardiopulmonary abnormality identified.      DX-PELVIS-1 OR 2 VIEWS   Final Result      Acute intertrochanteric fracture of the right femur with mild varus angulation.      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)   Assessment & Plan    Secondary to ground-level fall  Status post right hemiarthroplasty   Multimodal pain therapy   Heparin 5000 every 8 hours for DVT prophylaxis (intemittently refusing)  PT OT> SNF      Traumatic rhabdomyolysis (HCC)- (present on admission)   Assessment & Plan    Ground level fall for which he was on the floor over night  CPK down-trending      Debility- (present on admission)   Assessment & Plan    Multifactorial, age, recent fracture   Physical and occupational therapy evaluation       Dehydration- (present on admission)   Assessment & Plan    Improved post IVF's       DNR (do not resuscitate)- (present on admission)   Assessment & Plan    She has a known DO NOT RESUSCITATE status and his niece confirmed to previous attending       Dementia- (present on admission)   Assessment & Plan    Chronic condition, no behavioral issues at this time           VTE prophylaxis: heparin

## 2019-06-12 NOTE — DISCHARGE PLANNING
Received Transport Form at 0920.  Spoke to Paola at [facility].   Transport (wheelchair van) is scheduled for 6/12 at 1200 going to [facility].    RAMON Lantigua notified of transport time.

## 2019-06-12 NOTE — DISCHARGE PLANNING
Anticipated Discharge Disposition: SNF    Action: Accepted to [facility] today. Transportation arranged for 1200. Spoke with patient's niece, Mili [phone] and she is aware and agreeable. Bedside RN aware of time.     Barriers to Discharge: none    Plan: [facility]    Chart copied in bedside chart drawer

## 2019-06-12 NOTE — DISCHARGE PLANNING
Agency/Facility Name: [facility]   Spoke To: Diego   Outcome: Patient accepted. RN CLARISSA Lantigua notified.

## 2019-06-12 NOTE — DISCHARGE SUMMARY
Discharge Summary    CHIEF COMPLAINT ON ADMISSION  Chief Complaint   Patient presents with   • Fall     found down this morning   • Leg Pain     right upper thigh       Reason for Admission  Right lower extremity pain due to right femoral fracture    Admission Date  6/8/2019    CODE STATUS  DNAR/DNI     HPI & HOSPITAL COURSE  This is a 89 years old male with past medical history of chronic kidney disease, dementia admitted June 8 with right lower extremity pain found to have closed fracture of the right hip.  Orthopedics have been consulted and patient underwent right intramedullary device placement with Dr. Parker on June 8.  Patient was dehydrated on admission, he was found to have mildly elevated CPK, he received intravenous fluids.  CPK trended down and was 430 on the day of discharge, his renal function also improved back to baseline consistent with stage III kidney disease.  It is recommended to ensure that patient stays well-hydrated.  Physical Occupational Therapy evaluation recommended rehab at a skilled nursing facility.  Therefore, he is discharged in fair and stable condition to skilled nursing facility.  The patient met 2-midnight criteria for an inpatient stay at the time of discharge.    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  [address]  [address]  [facility] [address]  [phone]    Schedule an appointment as soon as possible for a visit in 1 week  Hospital follow-up appointment  "with PCP    [facility]  [address] [place] [address]  Ronald Mccabe [phone]  [phone]        MEDICATIONS ON DISCHARGE     Medication List      START taking these medications      Instructions   acetaminophen 500 MG Tabs  Commonly known as:  TYLENOL   Take 2 Tabs by mouth every 6 hours as needed.  Dose:  1000 mg     heparin 5000 UNIT/ML Soln   Inject 1 mL as instructed every 8 hours.  Dose:  5000 Units     polyethylene glycol/lytes Pack  Commonly known as:  MIRALAX   Take 1 Packet by mouth 1 time daily as needed (if sennosides and docusate ineffective after 24 hours).  Dose:  17 g        CONTINUE taking these medications      Instructions   aspirin EC 81 MG Tbec  Commonly known as:  ECOTRIN   Take 81 mg by mouth every day.  Dose:  81 mg     enalapril 10 MG Tabs  Commonly known as:  VASOTEC   Take 5 mg by mouth every day.  Dose:  5 mg     ferrous sulfate 325 (65 Fe) MG tablet   Take 325 mg by mouth every day.  Dose:  325 mg          Allergies  No Known Allergies    DIET  Orders Placed This Encounter   Procedures   • Diet Order Regular     Standing Status:   Standing     Number of Occurrences:   1     Order Specific Question:   Diet:     Answer:   Regular [1]     Order Specific Question:   Texture/Fiber modifications:     Answer:   Dysphagia 2(Pureed/Chopped)specify fluid consistency(question 6) [2]     Order Specific Question:   Consistency/Fluid modifications:     Answer:   Nectar Thick [2]     ACTIVITY  Activity and weightbearing status per orthopedics \"WBAT\"    CONSULTATIONS  Orthopedics    PROCEDURES  Right intramedullary device placement      LABORATORY  Lab Results   Component Value Date    SODIUM 141 06/12/2019    POTASSIUM 4.7 06/12/2019    CHLORIDE 108 06/12/2019    CO2 27 06/12/2019    GLUCOSE 112 (H) 06/12/2019    BUN 31 (H) 06/12/2019    CREATININE 1.23 06/12/2019        Lab Results   Component Value Date    WBC 9.6 06/11/2019    HEMOGLOBIN 8.1 (L) 06/11/2019    HEMATOCRIT 25.9 (L) 06/11/2019    " PLATELETCT 123 (L) 06/11/2019      Total time of the discharge process exceeds 36 minutes.

## 2019-06-13 LAB
BACTERIA BLD CULT: NORMAL
BACTERIA BLD CULT: NORMAL
SIGNIFICANT IND 70042: NORMAL
SIGNIFICANT IND 70042: NORMAL
SITE SITE: NORMAL
SITE SITE: NORMAL
SOURCE SOURCE: NORMAL
SOURCE SOURCE: NORMAL

## 2019-07-09 LAB — EKG IMPRESSION: NORMAL

## 2021-01-14 DIAGNOSIS — Z23 NEED FOR VACCINATION: ICD-10-CM

## 2022-06-16 NOTE — PROGRESS NOTES
Daughter called, states that Patient had another stroke today 6/16/22 & is in [facility],  same part of frontal lobe, not responding to verbal commands, cannot  arms, will  1 leg, cannot  the other. Very combative. Doing an echo and CT scan tomorrow. 6.17.22  Daughter informed to keep us updated and will let Dr. Femi Shabazz know. He will be out of the office until Monday.  Daughter voiced understanding//diane Patient failed RN dysphagia screen. Notified MD Wright per protocol. No orders at this time. Attending MD to reassess in the morning.

## 2025-04-16 NOTE — PROGRESS NOTES
Pt is AOX2.  CMS intact.  Pt pulled IV out despite education.  Will updated and notify incoming shift.   auscultation bilaterally- no wheezes, rales or rhonchi, normal air movement, no respiratory distress  Cardiovascular: normal rate, regular rhythm, normal S1 and S2, no murmurs, no gallops, intact distal pulses, and no carotid bruits  Abdomen: soft, non-tender, non-distended, normal bowel sounds, no masses or organomegaly  Extremities: no cyanosis, no clubbing, and no edema  Musculoskeletal: normal range of motion, no joint swelling, deformity or tenderness  Neurologic: gait and coordination normal and speech normal          No Known Allergies  Prior to Visit Medications    Medication Sig Taking? Authorizing Provider   levothyroxine (SYNTHROID) 137 MCG tablet Take 1 tablet by mouth daily Yes Sarah Bravo MD   insulin aspart (NOVOLOG) 100 UNIT/ML injection vial Inject 50 Units into the skin daily Yes Sarah Bravo MD   cetirizine (ZYRTEC) 10 MG tablet Take 1 tablet by mouth daily Yes Abdirahman Dubois MD   lisinopril (PRINIVIL;ZESTRIL) 2.5 MG tablet Take 1 tablet by mouth daily Yes Jackie Willis MD   fluticasone (FLONASE) 50 MCG/ACT nasal spray Use 2 sprays each nostril daily as needed. Yes Jackie Willis MD   atorvastatin (LIPITOR) 80 MG tablet Take 1 tablet by mouth daily Yes Scar Villa MD   tadalafil (CIALIS) 20 MG tablet Take 1 tablet by mouth daily as needed for Erectile Dysfunction Yes Amadou Garcia MD   Blood Glucose Monitoring Suppl DANA Agamatrix Presto blood glucose monitoring device, DX: E11.9 Yes Scar Villa MD   Coenzyme Q10 (COQ-10 PO) Take 1 tablet by mouth daily. 100mg Yes Sarah Bravo MD   Multiple Vitamin (MULTIVITAMIN PO) Take  by mouth daily.   Yes Sarah Bravo MD   aspirin 81 MG EC tablet Take 1 tablet by mouth daily Yes Provider, Historical, MD       CareTeam (Including outside providers/suppliers regularly involved in providing care):   Patient Care Team:  Shannon Sotelo DO as PCP - General (Family Medicine)  Shannon Sotelo DO as

## (undated) DEVICE — ELECTRODE 850 FOAM ADHESIVE - HYDROGEL RADIOTRNSPRNT (50/PK)

## (undated) DEVICE — DRAPE C-ARM LARGE 41IN X 74 IN - (10/BX 2BX/CA)

## (undated) DEVICE — DRAPE C ARMOR (12EA/CA)

## (undated) DEVICE — SET LEADWIRE 5 LEAD BEDSIDE DISPOSABLE ECG (1SET OF 5/EA)

## (undated) DEVICE — HEAD HOLDER JUNIOR/ADULT

## (undated) DEVICE — SET EXTENSION WITH 2 PORTS (48EA/CA) ***PART #2C8610 IS A SUBSTITUTE*****

## (undated) DEVICE — KIT ROOM DECONTAMINATION

## (undated) DEVICE — DRAPE LARGE 3 QUARTER - (20/CA)

## (undated) DEVICE — NEPTUNE 4 PORT MANIFOLD - (20/PK)

## (undated) DEVICE — SUCTION INSTRUMENT YANKAUER BULBOUS TIP W/O VENT (50EA/CA)

## (undated) DEVICE — SENSOR SPO2 NEO LNCS ADHESIVE (20/BX) SEE USER NOTES

## (undated) DEVICE — GOWN WARMING STANDARD FLEX - (30/CA)

## (undated) DEVICE — TOWELS CLOTH SURGICAL - (4/PK 20PK/CA)

## (undated) DEVICE — SUTURE 2-0 VICRYL PLUS CT-1 - 8 X 18 INCH(12/BX)

## (undated) DEVICE — MASK ANESTHESIA ADULT  - (100/CA)

## (undated) DEVICE — ELECTRODE DUAL RETURN W/ CORD - (50/PK)

## (undated) DEVICE — PACK MAJOR ORTHO - (2EA/CA)

## (undated) DEVICE — BIT DRILL LONG CALIBRATED 4.2MM X 330MM (4TX2=8)

## (undated) DEVICE — GLOVE BIOGEL SZ 7.5 SURGICAL PF LTX - (50PR/BX 4BX/CA)

## (undated) DEVICE — DETERGENT RENUZYME PLUS 10 OZ PACKET (50/BX)

## (undated) DEVICE — DRESSING TRANSPARENT FILM TEGADERM 4 X 4.75" (50EA/BX)"

## (undated) DEVICE — DRAPE SURGICAL U 77X120 - (10/CA)

## (undated) DEVICE — TUBING CLEARLINK DUO-VENT - C-FLO (48EA/CA)

## (undated) DEVICE — CHLORAPREP 26 ML APPLICATOR - ORANGE TINT(25/CA)

## (undated) DEVICE — LACTATED RINGERS INJ 1000 ML - (14EA/CA 60CA/PF)

## (undated) DEVICE — SUTURE GENERAL

## (undated) DEVICE — PROTECTOR ULNA NERVE - (36PR/CA)

## (undated) DEVICE — GLOVE BIOGEL INDICATOR SZ 7.5 SURGICAL PF LTX - (50PR/BX 4BX/CA)

## (undated) DEVICE — GUIDE PIN CALIBRATED (5EA/PK) (4TX6=24)

## (undated) DEVICE — CANISTER SUCTION 3000ML MECHANICAL FILTER AUTO SHUTOFF MEDI-VAC NONSTERILE LF DISP  (40EA/CA)

## (undated) DEVICE — KIT ANESTHESIA W/CIRCUIT & 3/LT BAG W/FILTER (20EA/CA)